# Patient Record
Sex: FEMALE | Employment: OTHER | URBAN - METROPOLITAN AREA
[De-identification: names, ages, dates, MRNs, and addresses within clinical notes are randomized per-mention and may not be internally consistent; named-entity substitution may affect disease eponyms.]

---

## 2017-05-18 ENCOUNTER — HOSPITAL ENCOUNTER (OUTPATIENT)
Dept: MAMMOGRAPHY | Age: 62
Discharge: HOME | End: 2017-05-18
Attending: FAMILY MEDICINE
Payer: COMMERCIAL

## 2017-05-18 DIAGNOSIS — Z12.31 VISIT FOR SCREENING MAMMOGRAM: ICD-10-CM

## 2017-05-18 PROCEDURE — 77067 SCR MAMMO BI INCL CAD: CPT

## 2018-12-22 ENCOUNTER — HOSPITAL ENCOUNTER (OUTPATIENT)
Dept: CT IMAGING | Age: 63
Discharge: HOME | End: 2018-12-22
Attending: FAMILY MEDICINE
Payer: MEDICARE

## 2018-12-22 DIAGNOSIS — G44.319 ACUTE POST-TRAUMATIC HEADACHE: ICD-10-CM

## 2018-12-22 PROCEDURE — 70450 CT HEAD/BRAIN W/O DYE: CPT

## 2021-04-01 ENCOUNTER — TRANSCRIBE ORDER (OUTPATIENT)
Dept: SCHEDULING | Age: 66
End: 2021-04-01

## 2021-04-01 DIAGNOSIS — M81.0 OSTEOPOROSIS: Primary | ICD-10-CM

## 2021-04-12 ENCOUNTER — HOSPITAL ENCOUNTER (OUTPATIENT)
Dept: MAMMOGRAPHY | Age: 66
Discharge: HOME | End: 2021-04-12
Attending: FAMILY MEDICINE
Payer: MEDICARE

## 2021-04-12 DIAGNOSIS — M81.0 OSTEOPOROSIS: ICD-10-CM

## 2021-04-12 PROCEDURE — 77080 DXA BONE DENSITY AXIAL: CPT

## 2022-09-23 ENCOUNTER — TRANSCRIBE ORDER (OUTPATIENT)
Dept: SCHEDULING | Age: 67
End: 2022-09-23

## 2022-09-23 DIAGNOSIS — R44.3 HALLUCINATIONS, UNSPECIFIED: Primary | ICD-10-CM

## 2022-10-26 ENCOUNTER — HOSPITAL ENCOUNTER (OUTPATIENT)
Dept: CT IMAGING | Age: 67
Discharge: HOME | End: 2022-10-26
Attending: FAMILY MEDICINE
Payer: MEDICARE

## 2022-10-26 DIAGNOSIS — R44.3 HALLUCINATIONS, UNSPECIFIED: ICD-10-CM

## 2022-10-26 PROCEDURE — 70450 CT HEAD/BRAIN W/O DYE: CPT

## 2024-06-18 ENCOUNTER — HOSPITAL ENCOUNTER (INPATIENT)
Facility: HOSPITAL | Age: 69
LOS: 3 days | Discharge: HOME | End: 2024-06-21
Attending: EMERGENCY MEDICINE | Admitting: INTERNAL MEDICINE
Payer: MEDICARE

## 2024-06-18 ENCOUNTER — APPOINTMENT (OUTPATIENT)
Facility: HOSPITAL | Age: 69
End: 2024-06-18
Payer: MEDICARE

## 2024-06-18 DIAGNOSIS — J96.21 ACUTE ON CHRONIC RESPIRATORY FAILURE WITH HYPOXIA AND HYPERCAPNIA (HCC): ICD-10-CM

## 2024-06-18 DIAGNOSIS — J96.01 ACUTE HYPOXEMIC RESPIRATORY FAILURE (HCC): Primary | ICD-10-CM

## 2024-06-18 DIAGNOSIS — J96.22 ACUTE ON CHRONIC RESPIRATORY FAILURE WITH HYPOXIA AND HYPERCAPNIA (HCC): ICD-10-CM

## 2024-06-18 LAB
ALBUMIN SERPL-MCNC: 3.1 G/DL (ref 3.5–5)
ALBUMIN/GLOB SERPL: 0.7 (ref 1.1–2.2)
ALP SERPL-CCNC: 91 U/L (ref 45–117)
ALT SERPL-CCNC: 10 U/L (ref 12–78)
ANION GAP BLD CALC-SCNC: 4 (ref 10–20)
ANION GAP SERPL CALC-SCNC: 3 MMOL/L (ref 5–15)
AST SERPL-CCNC: 19 U/L (ref 15–37)
B PERT DNA SPEC QL NAA+PROBE: NOT DETECTED
BASE EXCESS BLD CALC-SCNC: 9 MMOL/L
BASOPHILS # BLD: 0.1 K/UL (ref 0–0.1)
BASOPHILS NFR BLD: 1 % (ref 0–1)
BILIRUB SERPL-MCNC: 0.3 MG/DL (ref 0.2–1)
BORDETELLA PARAPERTUSSIS BY PCR: NOT DETECTED
BUN SERPL-MCNC: 14 MG/DL (ref 6–20)
BUN/CREAT SERPL: 23 (ref 12–20)
C PNEUM DNA SPEC QL NAA+PROBE: NOT DETECTED
CA-I BLD-MCNC: 1.15 MMOL/L (ref 1.12–1.32)
CALCIUM SERPL-MCNC: 9.3 MG/DL (ref 8.5–10.1)
CHLORIDE BLD-SCNC: 102 MMOL/L (ref 100–108)
CHLORIDE SERPL-SCNC: 103 MMOL/L (ref 97–108)
CO2 BLD-SCNC: 37 MMOL/L (ref 19–24)
CO2 SERPL-SCNC: 32 MMOL/L (ref 21–32)
CREAT SERPL-MCNC: 0.6 MG/DL (ref 0.55–1.02)
CREAT UR-MCNC: 0.4 MG/DL (ref 0.6–1.3)
DIFFERENTIAL METHOD BLD: ABNORMAL
EOSINOPHIL # BLD: 0.1 K/UL (ref 0–0.4)
EOSINOPHIL NFR BLD: 1 % (ref 0–7)
ERYTHROCYTE [DISTWIDTH] IN BLOOD BY AUTOMATED COUNT: 13.7 % (ref 11.5–14.5)
FLUAV SUBTYP SPEC NAA+PROBE: NOT DETECTED
FLUBV RNA SPEC QL NAA+PROBE: NOT DETECTED
GLOBULIN SER CALC-MCNC: 4.5 G/DL (ref 2–4)
GLUCOSE BLD STRIP.AUTO-MCNC: 130 MG/DL (ref 74–99)
GLUCOSE SERPL-MCNC: 128 MG/DL (ref 65–100)
HADV DNA SPEC QL NAA+PROBE: NOT DETECTED
HCO3 BLDA-SCNC: 36 MMOL/L
HCOV 229E RNA SPEC QL NAA+PROBE: NOT DETECTED
HCOV HKU1 RNA SPEC QL NAA+PROBE: NOT DETECTED
HCOV NL63 RNA SPEC QL NAA+PROBE: NOT DETECTED
HCOV OC43 RNA SPEC QL NAA+PROBE: NOT DETECTED
HCT VFR BLD AUTO: 39.2 % (ref 35–47)
HGB BLD-MCNC: 12 G/DL (ref 11.5–16)
HMPV RNA SPEC QL NAA+PROBE: NOT DETECTED
HPIV1 RNA SPEC QL NAA+PROBE: NOT DETECTED
HPIV2 RNA SPEC QL NAA+PROBE: NOT DETECTED
HPIV3 RNA SPEC QL NAA+PROBE: NOT DETECTED
HPIV4 RNA SPEC QL NAA+PROBE: NOT DETECTED
IMM GRANULOCYTES # BLD AUTO: 0 K/UL (ref 0–0.04)
IMM GRANULOCYTES NFR BLD AUTO: 0 % (ref 0–0.5)
LACTATE BLD-SCNC: <0.4 MMOL/L (ref 0.4–2)
LYMPHOCYTES # BLD: 1 K/UL (ref 0.8–3.5)
LYMPHOCYTES NFR BLD: 15 % (ref 12–49)
M PNEUMO DNA SPEC QL NAA+PROBE: NOT DETECTED
MCH RBC QN AUTO: 30.5 PG (ref 26–34)
MCHC RBC AUTO-ENTMCNC: 30.6 G/DL (ref 30–36.5)
MCV RBC AUTO: 99.7 FL (ref 80–99)
MONOCYTES # BLD: 0.5 K/UL (ref 0–1)
MONOCYTES NFR BLD: 7 % (ref 5–13)
NEUTS SEG # BLD: 5.3 K/UL (ref 1.8–8)
NEUTS SEG NFR BLD: 76 % (ref 32–75)
NRBC # BLD: 0 K/UL (ref 0–0.01)
NRBC BLD-RTO: 0 PER 100 WBC
NT PRO BNP: 932 PG/ML
PCO2 BLDV: 60.7 MMHG (ref 41–51)
PH BLDV: 7.39 (ref 7.32–7.42)
PLATELET # BLD AUTO: 338 K/UL (ref 150–400)
PMV BLD AUTO: 9.9 FL (ref 8.9–12.9)
PO2 BLDV: <27 MMHG (ref 25–40)
POTASSIUM BLD-SCNC: 3.9 MMOL/L (ref 3.5–5.5)
POTASSIUM SERPL-SCNC: 3.5 MMOL/L (ref 3.5–5.1)
PROCALCITONIN SERPL-MCNC: <0.05 NG/ML
PROT SERPL-MCNC: 7.6 G/DL (ref 6.4–8.2)
RBC # BLD AUTO: 3.93 M/UL (ref 3.8–5.2)
RSV RNA SPEC QL NAA+PROBE: NOT DETECTED
RV+EV RNA SPEC QL NAA+PROBE: NOT DETECTED
SARS-COV-2 RNA RESP QL NAA+PROBE: NOT DETECTED
SODIUM BLD-SCNC: 143 MMOL/L (ref 136–145)
SODIUM SERPL-SCNC: 138 MMOL/L (ref 136–145)
SPECIMEN SITE: ABNORMAL
WBC # BLD AUTO: 6.9 K/UL (ref 3.6–11)

## 2024-06-18 PROCEDURE — 82947 ASSAY GLUCOSE BLOOD QUANT: CPT

## 2024-06-18 PROCEDURE — 94664 DEMO&/EVAL PT USE INHALER: CPT

## 2024-06-18 PROCEDURE — 6360000002 HC RX W HCPCS: Performed by: EMERGENCY MEDICINE

## 2024-06-18 PROCEDURE — 84295 ASSAY OF SERUM SODIUM: CPT

## 2024-06-18 PROCEDURE — 71045 X-RAY EXAM CHEST 1 VIEW: CPT

## 2024-06-18 PROCEDURE — 6360000002 HC RX W HCPCS: Performed by: INTERNAL MEDICINE

## 2024-06-18 PROCEDURE — 96375 TX/PRO/DX INJ NEW DRUG ADDON: CPT

## 2024-06-18 PROCEDURE — 6370000000 HC RX 637 (ALT 250 FOR IP): Performed by: INTERNAL MEDICINE

## 2024-06-18 PROCEDURE — 82330 ASSAY OF CALCIUM: CPT

## 2024-06-18 PROCEDURE — 99285 EMERGENCY DEPT VISIT HI MDM: CPT

## 2024-06-18 PROCEDURE — 96374 THER/PROPH/DIAG INJ IV PUSH: CPT

## 2024-06-18 PROCEDURE — 94761 N-INVAS EAR/PLS OXIMETRY MLT: CPT

## 2024-06-18 PROCEDURE — 82803 BLOOD GASES ANY COMBINATION: CPT

## 2024-06-18 PROCEDURE — 84145 PROCALCITONIN (PCT): CPT

## 2024-06-18 PROCEDURE — 83880 ASSAY OF NATRIURETIC PEPTIDE: CPT

## 2024-06-18 PROCEDURE — 2500000003 HC RX 250 WO HCPCS: Performed by: EMERGENCY MEDICINE

## 2024-06-18 PROCEDURE — 93005 ELECTROCARDIOGRAM TRACING: CPT | Performed by: EMERGENCY MEDICINE

## 2024-06-18 PROCEDURE — 84132 ASSAY OF SERUM POTASSIUM: CPT

## 2024-06-18 PROCEDURE — 6360000002 HC RX W HCPCS: Performed by: PHYSICIAN ASSISTANT

## 2024-06-18 PROCEDURE — 80053 COMPREHEN METABOLIC PANEL: CPT

## 2024-06-18 PROCEDURE — 94640 AIRWAY INHALATION TREATMENT: CPT

## 2024-06-18 PROCEDURE — 2700000000 HC OXYGEN THERAPY PER DAY

## 2024-06-18 PROCEDURE — 0202U NFCT DS 22 TRGT SARS-COV-2: CPT

## 2024-06-18 PROCEDURE — 2060000000 HC ICU INTERMEDIATE R&B

## 2024-06-18 PROCEDURE — 36415 COLL VENOUS BLD VENIPUNCTURE: CPT

## 2024-06-18 PROCEDURE — 85025 COMPLETE CBC W/AUTO DIFF WBC: CPT

## 2024-06-18 PROCEDURE — 2580000003 HC RX 258: Performed by: EMERGENCY MEDICINE

## 2024-06-18 PROCEDURE — 6370000000 HC RX 637 (ALT 250 FOR IP): Performed by: EMERGENCY MEDICINE

## 2024-06-18 PROCEDURE — 2580000003 HC RX 258: Performed by: INTERNAL MEDICINE

## 2024-06-18 RX ORDER — BUDESONIDE 0.5 MG/2ML
0.5 INHALANT ORAL
Status: DISCONTINUED | OUTPATIENT
Start: 2024-06-18 | End: 2024-06-21 | Stop reason: HOSPADM

## 2024-06-18 RX ORDER — SODIUM CHLORIDE 0.9 % (FLUSH) 0.9 %
5-40 SYRINGE (ML) INJECTION EVERY 12 HOURS SCHEDULED
Status: DISCONTINUED | OUTPATIENT
Start: 2024-06-18 | End: 2024-06-21 | Stop reason: HOSPADM

## 2024-06-18 RX ORDER — DIAZEPAM 5 MG/1
5 TABLET ORAL EVERY 6 HOURS PRN
Status: DISCONTINUED | OUTPATIENT
Start: 2024-06-18 | End: 2024-06-21 | Stop reason: HOSPADM

## 2024-06-18 RX ORDER — ZOLPIDEM TARTRATE 5 MG/1
10 TABLET ORAL NIGHTLY PRN
Status: DISCONTINUED | OUTPATIENT
Start: 2024-06-18 | End: 2024-06-18

## 2024-06-18 RX ORDER — POLYETHYLENE GLYCOL 3350 17 G/17G
17 POWDER, FOR SOLUTION ORAL DAILY PRN
Status: DISCONTINUED | OUTPATIENT
Start: 2024-06-18 | End: 2024-06-21 | Stop reason: HOSPADM

## 2024-06-18 RX ORDER — ONDANSETRON 2 MG/ML
4 INJECTION INTRAMUSCULAR; INTRAVENOUS EVERY 6 HOURS PRN
Status: DISCONTINUED | OUTPATIENT
Start: 2024-06-18 | End: 2024-06-21 | Stop reason: HOSPADM

## 2024-06-18 RX ORDER — METOCLOPRAMIDE HYDROCHLORIDE 5 MG/ML
10 INJECTION INTRAMUSCULAR; INTRAVENOUS ONCE
Status: COMPLETED | OUTPATIENT
Start: 2024-06-18 | End: 2024-06-18

## 2024-06-18 RX ORDER — FLUOXETINE 10 MG/1
20 CAPSULE ORAL DAILY
Status: DISCONTINUED | OUTPATIENT
Start: 2024-06-19 | End: 2024-06-21 | Stop reason: HOSPADM

## 2024-06-18 RX ORDER — TRAMADOL HYDROCHLORIDE 50 MG/1
50 TABLET ORAL EVERY 6 HOURS PRN
Status: DISCONTINUED | OUTPATIENT
Start: 2024-06-18 | End: 2024-06-21 | Stop reason: HOSPADM

## 2024-06-18 RX ORDER — BUTALBITAL, ACETAMINOPHEN AND CAFFEINE 50; 325; 40 MG/1; MG/1; MG/1
1 TABLET ORAL EVERY 4 HOURS PRN
Status: DISCONTINUED | OUTPATIENT
Start: 2024-06-18 | End: 2024-06-21 | Stop reason: HOSPADM

## 2024-06-18 RX ORDER — FUROSEMIDE 10 MG/ML
40 INJECTION INTRAMUSCULAR; INTRAVENOUS ONCE
Status: COMPLETED | OUTPATIENT
Start: 2024-06-18 | End: 2024-06-18

## 2024-06-18 RX ORDER — SODIUM CHLORIDE 0.9 % (FLUSH) 0.9 %
5-40 SYRINGE (ML) INJECTION PRN
Status: DISCONTINUED | OUTPATIENT
Start: 2024-06-18 | End: 2024-06-21 | Stop reason: HOSPADM

## 2024-06-18 RX ORDER — DEXAMETHASONE SODIUM PHOSPHATE 10 MG/ML
10 INJECTION, SOLUTION INTRAMUSCULAR; INTRAVENOUS EVERY 6 HOURS
Status: DISCONTINUED | OUTPATIENT
Start: 2024-06-18 | End: 2024-06-18

## 2024-06-18 RX ORDER — IPRATROPIUM BROMIDE AND ALBUTEROL SULFATE 2.5; .5 MG/3ML; MG/3ML
1 SOLUTION RESPIRATORY (INHALATION)
Status: COMPLETED | OUTPATIENT
Start: 2024-06-18 | End: 2024-06-18

## 2024-06-18 RX ORDER — ENOXAPARIN SODIUM 100 MG/ML
30 INJECTION SUBCUTANEOUS DAILY
Status: DISCONTINUED | OUTPATIENT
Start: 2024-06-18 | End: 2024-06-21 | Stop reason: HOSPADM

## 2024-06-18 RX ORDER — ARFORMOTEROL TARTRATE 15 UG/2ML
15 SOLUTION RESPIRATORY (INHALATION)
Status: DISCONTINUED | OUTPATIENT
Start: 2024-06-18 | End: 2024-06-21 | Stop reason: HOSPADM

## 2024-06-18 RX ORDER — PROPRANOLOL HCL 60 MG
60 CAPSULE, EXTENDED RELEASE 24HR ORAL 2 TIMES DAILY
Status: DISCONTINUED | OUTPATIENT
Start: 2024-06-18 | End: 2024-06-18

## 2024-06-18 RX ORDER — QUETIAPINE FUMARATE 25 MG/1
50 TABLET, FILM COATED ORAL NIGHTLY
Status: DISCONTINUED | OUTPATIENT
Start: 2024-06-18 | End: 2024-06-21 | Stop reason: HOSPADM

## 2024-06-18 RX ORDER — PANTOPRAZOLE SODIUM 40 MG/1
40 TABLET, DELAYED RELEASE ORAL
Status: DISCONTINUED | OUTPATIENT
Start: 2024-06-19 | End: 2024-06-21 | Stop reason: HOSPADM

## 2024-06-18 RX ORDER — SODIUM CHLORIDE 9 MG/ML
INJECTION, SOLUTION INTRAVENOUS PRN
Status: DISCONTINUED | OUTPATIENT
Start: 2024-06-18 | End: 2024-06-21 | Stop reason: HOSPADM

## 2024-06-18 RX ORDER — ACETAMINOPHEN 325 MG/1
650 TABLET ORAL EVERY 6 HOURS PRN
Status: DISCONTINUED | OUTPATIENT
Start: 2024-06-18 | End: 2024-06-21 | Stop reason: HOSPADM

## 2024-06-18 RX ORDER — ACETAMINOPHEN 650 MG/1
650 SUPPOSITORY RECTAL EVERY 6 HOURS PRN
Status: DISCONTINUED | OUTPATIENT
Start: 2024-06-18 | End: 2024-06-21 | Stop reason: HOSPADM

## 2024-06-18 RX ORDER — FUROSEMIDE 10 MG/ML
20 INJECTION INTRAMUSCULAR; INTRAVENOUS 2 TIMES DAILY
Status: DISCONTINUED | OUTPATIENT
Start: 2024-06-18 | End: 2024-06-19

## 2024-06-18 RX ORDER — LEVOFLOXACIN 5 MG/ML
750 INJECTION, SOLUTION INTRAVENOUS EVERY 24 HOURS
Status: DISCONTINUED | OUTPATIENT
Start: 2024-06-18 | End: 2024-06-19

## 2024-06-18 RX ORDER — LANOLIN ALCOHOL/MO/W.PET/CERES
3 CREAM (GRAM) TOPICAL NIGHTLY PRN
Status: DISCONTINUED | OUTPATIENT
Start: 2024-06-18 | End: 2024-06-21 | Stop reason: HOSPADM

## 2024-06-18 RX ORDER — LEVOTHYROXINE SODIUM 0.05 MG/1
50 TABLET ORAL
Status: DISCONTINUED | OUTPATIENT
Start: 2024-06-18 | End: 2024-06-21 | Stop reason: HOSPADM

## 2024-06-18 RX ORDER — PREDNISONE 20 MG/1
40 TABLET ORAL DAILY
Status: DISCONTINUED | OUTPATIENT
Start: 2024-06-19 | End: 2024-06-19

## 2024-06-18 RX ORDER — ONDANSETRON 4 MG/1
4 TABLET, ORALLY DISINTEGRATING ORAL EVERY 8 HOURS PRN
Status: DISCONTINUED | OUTPATIENT
Start: 2024-06-18 | End: 2024-06-21 | Stop reason: HOSPADM

## 2024-06-18 RX ORDER — PROPRANOLOL HCL 60 MG
60 CAPSULE, EXTENDED RELEASE 24HR ORAL DAILY
Status: DISCONTINUED | OUTPATIENT
Start: 2024-06-19 | End: 2024-06-21 | Stop reason: HOSPADM

## 2024-06-18 RX ORDER — BENZONATATE 100 MG/1
100 CAPSULE ORAL 3 TIMES DAILY PRN
Status: DISCONTINUED | OUTPATIENT
Start: 2024-06-18 | End: 2024-06-21 | Stop reason: HOSPADM

## 2024-06-18 RX ORDER — VENLAFAXINE HYDROCHLORIDE 150 MG/1
150 CAPSULE, EXTENDED RELEASE ORAL DAILY
Status: DISCONTINUED | OUTPATIENT
Start: 2024-06-18 | End: 2024-06-21 | Stop reason: HOSPADM

## 2024-06-18 RX ADMIN — SODIUM CHLORIDE: 9 INJECTION, SOLUTION INTRAVENOUS at 11:04

## 2024-06-18 RX ADMIN — DOXYCYCLINE 100 MG: 100 INJECTION, POWDER, LYOPHILIZED, FOR SOLUTION INTRAVENOUS at 08:06

## 2024-06-18 RX ADMIN — SODIUM CHLORIDE, PRESERVATIVE FREE 10 ML: 5 INJECTION INTRAVENOUS at 10:40

## 2024-06-18 RX ADMIN — BUDESONIDE 500 MCG: 0.5 INHALANT RESPIRATORY (INHALATION) at 12:30

## 2024-06-18 RX ADMIN — QUETIAPINE FUMARATE 50 MG: 25 TABLET ORAL at 21:42

## 2024-06-18 RX ADMIN — SODIUM CHLORIDE, PRESERVATIVE FREE 10 ML: 5 INJECTION INTRAVENOUS at 21:42

## 2024-06-18 RX ADMIN — FUROSEMIDE 20 MG: 10 INJECTION, SOLUTION INTRAMUSCULAR; INTRAVENOUS at 18:10

## 2024-06-18 RX ADMIN — ARFORMOTEROL TARTRATE 15 MCG: 15 SOLUTION RESPIRATORY (INHALATION) at 20:10

## 2024-06-18 RX ADMIN — LEVOFLOXACIN 750 MG: 5 INJECTION, SOLUTION INTRAVENOUS at 11:05

## 2024-06-18 RX ADMIN — ARFORMOTEROL TARTRATE 15 MCG: 15 SOLUTION RESPIRATORY (INHALATION) at 12:30

## 2024-06-18 RX ADMIN — METOCLOPRAMIDE 10 MG: 5 INJECTION, SOLUTION INTRAMUSCULAR; INTRAVENOUS at 05:56

## 2024-06-18 RX ADMIN — DEXAMETHASONE SODIUM PHOSPHATE 10 MG: 10 INJECTION, SOLUTION INTRAMUSCULAR; INTRAVENOUS at 07:05

## 2024-06-18 RX ADMIN — DEXAMETHASONE SODIUM PHOSPHATE 10 MG: 10 INJECTION, SOLUTION INTRAMUSCULAR; INTRAVENOUS at 13:30

## 2024-06-18 RX ADMIN — LEVOTHYROXINE SODIUM 50 MCG: 0.05 TABLET ORAL at 10:46

## 2024-06-18 RX ADMIN — ENOXAPARIN SODIUM 30 MG: 100 INJECTION SUBCUTANEOUS at 10:36

## 2024-06-18 RX ADMIN — VENLAFAXINE HYDROCHLORIDE 150 MG: 150 CAPSULE, EXTENDED RELEASE ORAL at 10:36

## 2024-06-18 RX ADMIN — IPRATROPIUM BROMIDE AND ALBUTEROL SULFATE 1 DOSE: .5; 3 SOLUTION RESPIRATORY (INHALATION) at 05:59

## 2024-06-18 RX ADMIN — PROPRANOLOL HYDROCHLORIDE 60 MG: 60 CAPSULE, EXTENDED RELEASE ORAL at 10:46

## 2024-06-18 RX ADMIN — FUROSEMIDE 40 MG: 10 INJECTION, SOLUTION INTRAMUSCULAR; INTRAVENOUS at 08:06

## 2024-06-18 RX ADMIN — BUDESONIDE 500 MCG: 0.5 INHALANT RESPIRATORY (INHALATION) at 20:10

## 2024-06-18 RX ADMIN — ACETAMINOPHEN 650 MG: 325 TABLET ORAL at 18:10

## 2024-06-18 ASSESSMENT — PAIN DESCRIPTION - ORIENTATION
ORIENTATION: ANTERIOR
ORIENTATION: ANTERIOR

## 2024-06-18 ASSESSMENT — PAIN SCALES - GENERAL
PAINLEVEL_OUTOF10: 0
PAINLEVEL_OUTOF10: 10
PAINLEVEL_OUTOF10: 0
PAINLEVEL_OUTOF10: 6
PAINLEVEL_OUTOF10: 0
PAINLEVEL_OUTOF10: 4

## 2024-06-18 ASSESSMENT — PAIN DESCRIPTION - LOCATION
LOCATION: HEAD
LOCATION: HEAD

## 2024-06-18 ASSESSMENT — PAIN DESCRIPTION - DESCRIPTORS
DESCRIPTORS: ACHING
DESCRIPTORS: CRUSHING;JABBING

## 2024-06-18 ASSESSMENT — PAIN DESCRIPTION - PAIN TYPE: TYPE: ACUTE PAIN

## 2024-06-18 ASSESSMENT — PAIN - FUNCTIONAL ASSESSMENT: PAIN_FUNCTIONAL_ASSESSMENT: ACTIVITIES ARE NOT PREVENTED

## 2024-06-18 ASSESSMENT — PAIN DESCRIPTION - FREQUENCY: FREQUENCY: INTERMITTENT

## 2024-06-18 NOTE — ED NOTES
Verbal and bedside ED summary given to receiving RN (Zenia). Care transitioned at the bedside; all questions answered.       - Pt is AO4 on high alex nc  - Side rails x2  - Call light within reach   - No acute distress reported or observed

## 2024-06-18 NOTE — ED PROVIDER NOTES
[facility] 2 PROGRESSIVE CARE  EMERGENCY DEPARTMENT ENCOUNTER       Pt Name: Mara Sharp  MRN: [id number]  Birthdate 1955  Date of evaluation: 6/18/2024  Provider: Joon Vera DO   PCP: Nenita Poe MD  Note Started: 5:43 AM EDT 6/18/24     CHIEF COMPLAINT       Chief Complaint   Patient presents with    Headache        HISTORY OF PRESENT ILLNESS: 1 or more elements      History From: Patient, History limited by: none     Mara Sharp is a 69 y.o. female presenting the emergency department complaining of headache, shortness of breath.  Patient has a history of idiopathic pulmonary fibrosis, has a history of chronic headaches.  Headache is similar to her prior headaches, no visual disturbances or lateralizing deficits, took some aspirin at home with no improvement.  Denies any fever.  She also feels like her breathing is not quite normal, she is chronically on 3 L oxygen secondary to idiopathic pulmonary fibrosis.  She became concerned when she noticed her oxygen level around 91 to 92%, she states her pulse oxygen is normally around 95 to 96% on 3 L per       Please See MDM for Additional Details of the HPI/PMH  Nursing Notes were all reviewed and agreed with or any disagreements were addressed in the HPI.     REVIEW OF SYSTEMS        Positives and Pertinent negatives as per HPI.    PAST HISTORY     Past Medical History:  Past Medical History:   Diagnosis Date    Chronic back pain     Endocrine disease     thyroid disease, hormone imbalance    Hx of seasonal allergies     Hx of tonsillectomy     Insomnia     Psychiatric disorder     depression/anxiety    Tremor     Tubal ligation status        Past Surgical History:  Past Surgical History:   Procedure Laterality Date    BREAST BIOPSY Right     benign surgical bx    BREAST SURGERY      lumpectomy    CATARACT REMOVAL      GASTRIC BYPASS SURGERY      ORTHOPEDIC SURGERY      spinal    TONSILLECTOMY      TUBAL LIGATION         Family History:  Family History  Nucleated RBCs 0.0 0  WBC    nRBC 0.00 0.00 - 0.01 K/uL    Neutrophils % 76 (H) 32 - 75 %    Lymphocytes % 15 12 - 49 %    Monocytes % 7 5 - 13 %    Eosinophils % 1 0 - 7 %    Basophils % 1 0 - 1 %    Immature Granulocytes % 0 0.0 - 0.5 %    Neutrophils Absolute 5.3 1.8 - 8.0 K/UL    Lymphocytes Absolute 1.0 0.8 - 3.5 K/UL    Monocytes Absolute 0.5 0.0 - 1.0 K/UL    Eosinophils Absolute 0.1 0.0 - 0.4 K/UL    Basophils Absolute 0.1 0.0 - 0.1 K/UL    Immature Granulocytes Absolute 0.0 0.00 - 0.04 K/UL    Differential Type AUTOMATED     Comprehensive Metabolic Panel    Collection Time: 06/18/24  5:51 AM   Result Value Ref Range    Sodium 138 136 - 145 mmol/L    Potassium 3.5 3.5 - 5.1 mmol/L    Chloride 103 97 - 108 mmol/L    CO2 32 21 - 32 mmol/L    Anion Gap 3 (L) 5 - 15 mmol/L    Glucose 128 (H) 65 - 100 mg/dL    BUN 14 6 - 20 MG/DL    Creatinine 0.60 0.55 - 1.02 MG/DL    BUN/Creatinine Ratio 23 (H) 12 - 20      Est, Glom Filt Rate >90 >60 ml/min/1.73m2    Calcium 9.3 8.5 - 10.1 MG/DL    Total Bilirubin 0.3 0.2 - 1.0 MG/DL    ALT 10 (L) 12 - 78 U/L    AST 19 15 - 37 U/L    Alk Phosphatase 91 45 - 117 U/L    Total Protein 7.6 6.4 - 8.2 g/dL    Albumin 3.1 (L) 3.5 - 5.0 g/dL    Globulin 4.5 (H) 2.0 - 4.0 g/dL    Albumin/Globulin Ratio 0.7 (L) 1.1 - 2.2     Brain Natriuretic Peptide    Collection Time: 06/18/24  5:51 AM   Result Value Ref Range    NT Pro- (H) <125 PG/ML   EKG 12 Lead    Collection Time: 06/18/24  6:00 AM   Result Value Ref Range    Ventricular Rate 77 BPM    Atrial Rate 77 BPM    P-R Interval 154 ms    QRS Duration 80 ms    Q-T Interval 388 ms    QTc Calculation (Bazett) 439 ms    P Axis 89 degrees    R Axis -27 degrees    T Axis 109 degrees    Diagnosis       Normal sinus rhythm  ST & T wave abnormality, consider lateral ischemia  When compared with ECG of 22-SEP-2012 12:23,  Nonspecific T wave abnormality now evident in Lateral leads     POCT Blood Gas & Electrolytes    Collection Time:

## 2024-06-18 NOTE — CONSULTS
Pulmonary Note  Name: Mara Sharp     : 1955  Hospital: [facility]    Date: 2024 9:05 AM Date of Admission: 2024       Impression:   Plan:   -- Chronic hypoxic / hypercarbic respiratory failure - not clear to me that she is markedly different from her baseline functioning  -- MAC, bronchiectasis, hx pseudomonas  -- RA/dermatomyositis  -- Deconditioning  -- Headache  -- Anxiety -- o2, baseline 3l/m, may need more with exertion  -- Levaquin noted, reasonable.  Can switch back to her chronic azithro/moxifloxacin at discharge.  -- Mobilize as able  -- Low dose diuretic reasonable  -- Echo to screen for PH  -- ICS/LABA - home advair  -- Can taper off steroids from our view  -- Flutter valve  -- DVT ppx    Thanks for the consult!     CC:   Chief Complaint   Patient presents with    Headache      Interval History:      Initial HPI:   -  Presented to the ED with headache and dyspnea.  Reports dyspnea particularly worse with exertion, insidious onset over the past several weeks.  Quality breathlessness, modified by worse with exertion, associated intermittent nonproductive cough which is mild.  Known bronchiectasis/MAC, follows with Dr. Sanchez at [facility].  She is currently on second line MAC therapy with azithro and moxifloxacin.  She does have history pseudomonas.  Tells me that she is due for a repeat CT scan with him to ensure no significant progression of underlying disease, her last CT scans she reports were stable.  Denies chest pain or fevers, no change in sputum.  Does get some LE swelling but goes away when she elevates her legs.  Per Dr. Sanchez's last clinic note, advair 250, nebs.  RA/dermatomyositis as well for which she follows with rheumatology.  I turned her down to her usual 3 l/m and her sat is 93% at rest.    Nonsmoker, father had hx MAC as well she reports.    Exam Findings:  General: Awake, alert, no acute distress   HEENT: NC/AT, EOMI, moist mucous membranes

## 2024-06-18 NOTE — CARE COORDINATION
Care Management Initial Assessment       RUR: 10%(LOW RUR)  Readmission? No  1st IM letter given? Yes - 06/17  1st  letter given: No     06/18/24 7617   Service Assessment   Patient Orientation Alert and Oriented;Person;Place;Situation;Self   Cognition Alert   History Provided By Patient   Primary Caregiver Self   Accompanied By/Relationship Marii Melchor [phone]   Support Systems Spouse/Significant Other;Family Members  (Josh Sharp, Spouse [phone] and Marii Melchor [phone])   Patient's Healthcare Decision Maker is: Named in Scanned ACP Document   PCP Verified by CM Yes   Last Visit to PCP Within last 3 months   Prior Functional Level Assistance with the following:;Bathing;Dressing;Toileting;Feeding;Cooking;Housework;Mobility   Current Functional Level Assistance with the following:;Bathing;Dressing;Toileting;Feeding;Cooking;Housework;Shopping;Mobility   Can patient return to prior living arrangement Yes   Ability to make needs known: Good   Family able to assist with home care needs: Yes   Would you like for me to discuss the discharge plan with any other family members/significant others, and if so, who? Yes  (Josh Sharp, Spouse [phone] and Marii Melchor [phone])   Financial Resources Medicare   Community Resources None   CM/SW Referral DME   Social/Functional History   Lives With Spouse   Type of Home House   Home Layout Two level;Able to Live on Main level with bedroom/bathroom   Home Access Stairs to enter with rails   Entrance Stairs - Number of Steps 6 MEAGHAN in the back; 2 MEAGHAN in the front   Entrance Stairs - Rails Both   Bathroom Equipment Toilet raiser;Shower chair;Commode   Home Equipment Walker - Rolling;Cane;Wheelchair - Manual;Oxygen  (3LPM ([employer])  and nebulizer)   Active  No   Patient's  Info Marii Melchor [phone]   Discharge Planning   Type of Residence House   Living Arrangements Spouse/Significant Other  (Josh Sharp, Spouse [phone] and private caregivers

## 2024-06-18 NOTE — ED NOTES
DO Vera made aware of patient's O2 sats and nc 6L. No new orders at this time. Pt is currently resting with mouth open and eyes closed. Rouses easily.

## 2024-06-18 NOTE — H&P
Hospitalist Admission Note    NAME:   Mara Sharp   : 1955   MRN: [id number]     Date/Time: 2024 4:48 PM    Patient PCP: Nenita Poe MD    ______________________________________________________________________  Given the patient's current clinical presentation, I have a high level of concern for decompensation if discharged from the emergency department.  Complex decision making was performed, which includes reviewing the patient's available past medical records, laboratory results, and x-ray films.       My assessment of this patient's clinical condition and my plan of care is as follows.    Assessment / Plan:  Acute on chronic hypoxic respiratory failure  Hx of MAC    On 3L of oxygen on baseline  In ED, desaturated to 86% on 5L of supplemental oxygen- oxygen requirement went upto 8L- now back to 3L of supplemental oxygen  Start levofloxacin for possible pneumonia  Will follow procalcitonin  Start low dose lasix  Will follow ECHO  Will change iv steroids to po prednisone and taper off steroids as per pulmonary recs  Appreciate pulmonary consult      Anxiety:  Resume home meds  Valium prn, seroquel, venlafaxine, fluoxetine      Headache  Resume home meds- fioricet prn    Deconditioning  PT/OT consult                      Medical Decision Making:   I personally reviewed labs: cbc, CMP  I personally reviewed imaging:CXR  I personally reviewed EKG: interpreted by me- normal sinus rhythm, no acute ST-T wave changes  Toxic drug monitoring:   Discussed case with: ED provider. After discussion I am in agreement that acuity of patient's medical condition necessitates hospital stay.      Code Status: DNR  DVT Prophylaxis: lovenox  Baseline:     Subjective:   CHIEF COMPLAINT: shortness of breath    HISTORY OF PRESENT ILLNESS:     Mara Sharp is a 69 y.o.  female with PMHx significant for chronic back pain, depression, anxiety, hx of MAC, Rheumatoid arthritis , headache, anxiety presented to ED with  c/o shortness of breath. She is currently on second line therapy for MAC with azithromycin and moxifloxacin.   Patient is poor historian  Later, patient's step daughter was by the bedside. States that patient follow Dr. Sanchez at [facility]. Was suppose to get cognitive test for dementia for which they have waited for years tomorrow which she had to cancel. Has appointment for CT scan and pulmonary consult on Thursday.      We were asked to admit for work up and evaluation of the above problems.     Past Medical History:   Diagnosis Date    Chronic back pain     Endocrine disease     thyroid disease, hormone imbalance    Hx of seasonal allergies     Hx of tonsillectomy     Insomnia     Psychiatric disorder     depression/anxiety    Tremor     Tubal ligation status         Past Surgical History:   Procedure Laterality Date    BREAST BIOPSY Right     benign surgical bx    BREAST SURGERY      lumpectomy    CATARACT REMOVAL      GASTRIC BYPASS SURGERY      ORTHOPEDIC SURGERY      spinal    TONSILLECTOMY      TUBAL LIGATION         Social History     Tobacco Use    Smoking status: Never    Smokeless tobacco: Never   Substance Use Topics    Alcohol use: No        Family History   Problem Relation Age of Onset    Heart Disease Father     Cancer Mother     Allergy (Severe) Other        Allergies   Allergen Reactions    Cephalosporins Other (See Comments)     \"jittery\"    Montelukast      Other reaction(s): Unknown (comments)  \"jittery\"    Nsaids Other (See Comments)     Does not take due to gastric bypass surgery        Prior to Admission medications    Medication Sig Start Date End Date Taking? Authorizing Provider   GARLIC PO Take by mouth    Automatic Reconciliation, Ar   B Complex Vitamins (VITAMIN B COMPLEX PO) Take by mouth    Automatic Reconciliation, Ar   cyanocobalamin 1000 MCG/ML injection Inject into the muscle See Admin Instructions    Automatic Reconciliation, Ar   diazePAM (VALIUM) 5 MG tablet Take by mouth every 6  Jenny Garcia MD    Procedures: see electronic medical records for all procedures/Xrays and details which were not copied into this note but were reviewed prior to creation of Plan.

## 2024-06-19 LAB
ANION GAP SERPL CALC-SCNC: 4 MMOL/L (ref 5–15)
BASOPHILS # BLD: 0 K/UL (ref 0–0.1)
BASOPHILS NFR BLD: 0 % (ref 0–1)
BUN SERPL-MCNC: 24 MG/DL (ref 6–20)
BUN/CREAT SERPL: 23 (ref 12–20)
CALCIUM SERPL-MCNC: 9.1 MG/DL (ref 8.5–10.1)
CHLORIDE SERPL-SCNC: 100 MMOL/L (ref 97–108)
CO2 SERPL-SCNC: 34 MMOL/L (ref 21–32)
CREAT SERPL-MCNC: 1.04 MG/DL (ref 0.55–1.02)
DIFFERENTIAL METHOD BLD: ABNORMAL
EOSINOPHIL # BLD: 0 K/UL (ref 0–0.4)
EOSINOPHIL NFR BLD: 0 % (ref 0–7)
ERYTHROCYTE [DISTWIDTH] IN BLOOD BY AUTOMATED COUNT: 13.6 % (ref 11.5–14.5)
GLUCOSE SERPL-MCNC: 154 MG/DL (ref 65–100)
HCT VFR BLD AUTO: 36.4 % (ref 35–47)
HGB BLD-MCNC: 11.5 G/DL (ref 11.5–16)
IMM GRANULOCYTES # BLD AUTO: 0.1 K/UL (ref 0–0.04)
IMM GRANULOCYTES NFR BLD AUTO: 1 % (ref 0–0.5)
LYMPHOCYTES # BLD: 1 K/UL (ref 0.8–3.5)
LYMPHOCYTES NFR BLD: 14 % (ref 12–49)
MAGNESIUM SERPL-MCNC: 1.5 MG/DL (ref 1.6–2.4)
MCH RBC QN AUTO: 30.7 PG (ref 26–34)
MCHC RBC AUTO-ENTMCNC: 31.6 G/DL (ref 30–36.5)
MCV RBC AUTO: 97.1 FL (ref 80–99)
MONOCYTES # BLD: 0.4 K/UL (ref 0–1)
MONOCYTES NFR BLD: 6 % (ref 5–13)
NEUTS SEG # BLD: 5.6 K/UL (ref 1.8–8)
NEUTS SEG NFR BLD: 79 % (ref 32–75)
NRBC # BLD: 0 K/UL (ref 0–0.01)
NRBC BLD-RTO: 0 PER 100 WBC
PHOSPHATE SERPL-MCNC: 4 MG/DL (ref 2.6–4.7)
PLATELET # BLD AUTO: 342 K/UL (ref 150–400)
PMV BLD AUTO: 9.9 FL (ref 8.9–12.9)
POTASSIUM SERPL-SCNC: 3.4 MMOL/L (ref 3.5–5.1)
PROCALCITONIN SERPL-MCNC: <0.05 NG/ML
RBC # BLD AUTO: 3.75 M/UL (ref 3.8–5.2)
SODIUM SERPL-SCNC: 138 MMOL/L (ref 136–145)
WBC # BLD AUTO: 7.1 K/UL (ref 3.6–11)

## 2024-06-19 PROCEDURE — 83735 ASSAY OF MAGNESIUM: CPT

## 2024-06-19 PROCEDURE — 36415 COLL VENOUS BLD VENIPUNCTURE: CPT

## 2024-06-19 PROCEDURE — 2060000000 HC ICU INTERMEDIATE R&B

## 2024-06-19 PROCEDURE — 80048 BASIC METABOLIC PNL TOTAL CA: CPT

## 2024-06-19 PROCEDURE — 97162 PT EVAL MOD COMPLEX 30 MIN: CPT

## 2024-06-19 PROCEDURE — 6370000000 HC RX 637 (ALT 250 FOR IP): Performed by: INTERNAL MEDICINE

## 2024-06-19 PROCEDURE — 85025 COMPLETE CBC W/AUTO DIFF WBC: CPT

## 2024-06-19 PROCEDURE — 6360000002 HC RX W HCPCS: Performed by: STUDENT IN AN ORGANIZED HEALTH CARE EDUCATION/TRAINING PROGRAM

## 2024-06-19 PROCEDURE — 97530 THERAPEUTIC ACTIVITIES: CPT

## 2024-06-19 PROCEDURE — 84145 PROCALCITONIN (PCT): CPT

## 2024-06-19 PROCEDURE — 84100 ASSAY OF PHOSPHORUS: CPT

## 2024-06-19 PROCEDURE — 94640 AIRWAY INHALATION TREATMENT: CPT

## 2024-06-19 PROCEDURE — 6370000000 HC RX 637 (ALT 250 FOR IP): Performed by: STUDENT IN AN ORGANIZED HEALTH CARE EDUCATION/TRAINING PROGRAM

## 2024-06-19 PROCEDURE — 6360000002 HC RX W HCPCS: Performed by: PHYSICIAN ASSISTANT

## 2024-06-19 PROCEDURE — 97166 OT EVAL MOD COMPLEX 45 MIN: CPT

## 2024-06-19 PROCEDURE — 2580000003 HC RX 258: Performed by: INTERNAL MEDICINE

## 2024-06-19 PROCEDURE — 6360000002 HC RX W HCPCS: Performed by: INTERNAL MEDICINE

## 2024-06-19 PROCEDURE — 97116 GAIT TRAINING THERAPY: CPT

## 2024-06-19 RX ORDER — LEVOFLOXACIN 5 MG/ML
750 INJECTION, SOLUTION INTRAVENOUS
Status: DISCONTINUED | OUTPATIENT
Start: 2024-06-20 | End: 2024-06-20 | Stop reason: SINTOL

## 2024-06-19 RX ORDER — MAGNESIUM SULFATE 1 G/100ML
1000 INJECTION INTRAVENOUS ONCE
Status: COMPLETED | OUTPATIENT
Start: 2024-06-19 | End: 2024-06-19

## 2024-06-19 RX ORDER — PREDNISONE 20 MG/1
20 TABLET ORAL DAILY
Status: DISCONTINUED | OUTPATIENT
Start: 2024-06-20 | End: 2024-06-21 | Stop reason: HOSPADM

## 2024-06-19 RX ORDER — POTASSIUM CHLORIDE 20 MEQ/1
20 TABLET, EXTENDED RELEASE ORAL ONCE
Status: COMPLETED | OUTPATIENT
Start: 2024-06-19 | End: 2024-06-19

## 2024-06-19 RX ADMIN — ARFORMOTEROL TARTRATE 15 MCG: 15 SOLUTION RESPIRATORY (INHALATION) at 10:00

## 2024-06-19 RX ADMIN — MAGNESIUM SULFATE HEPTAHYDRATE 1000 MG: 1 INJECTION, SOLUTION INTRAVENOUS at 10:01

## 2024-06-19 RX ADMIN — ENOXAPARIN SODIUM 30 MG: 100 INJECTION SUBCUTANEOUS at 09:13

## 2024-06-19 RX ADMIN — PROPRANOLOL HYDROCHLORIDE 60 MG: 60 CAPSULE, EXTENDED RELEASE ORAL at 09:14

## 2024-06-19 RX ADMIN — FLUOXETINE 20 MG: 10 CAPSULE ORAL at 09:16

## 2024-06-19 RX ADMIN — VENLAFAXINE HYDROCHLORIDE 150 MG: 150 CAPSULE, EXTENDED RELEASE ORAL at 09:14

## 2024-06-19 RX ADMIN — SODIUM CHLORIDE, PRESERVATIVE FREE 10 ML: 5 INJECTION INTRAVENOUS at 09:14

## 2024-06-19 RX ADMIN — PANTOPRAZOLE SODIUM 40 MG: 40 TABLET, DELAYED RELEASE ORAL at 09:17

## 2024-06-19 RX ADMIN — LEVOTHYROXINE SODIUM 50 MCG: 0.05 TABLET ORAL at 09:16

## 2024-06-19 RX ADMIN — SODIUM CHLORIDE, PRESERVATIVE FREE 10 ML: 5 INJECTION INTRAVENOUS at 20:00

## 2024-06-19 RX ADMIN — FUROSEMIDE 20 MG: 10 INJECTION, SOLUTION INTRAMUSCULAR; INTRAVENOUS at 09:17

## 2024-06-19 RX ADMIN — SODIUM CHLORIDE: 9 INJECTION, SOLUTION INTRAVENOUS at 11:23

## 2024-06-19 RX ADMIN — ACETAMINOPHEN 650 MG: 325 TABLET ORAL at 11:29

## 2024-06-19 RX ADMIN — QUETIAPINE FUMARATE 50 MG: 25 TABLET ORAL at 21:24

## 2024-06-19 RX ADMIN — POTASSIUM CHLORIDE 20 MEQ: 1500 TABLET, EXTENDED RELEASE ORAL at 09:17

## 2024-06-19 RX ADMIN — TRAMADOL HYDROCHLORIDE 50 MG: 50 TABLET ORAL at 15:16

## 2024-06-19 RX ADMIN — BUDESONIDE 500 MCG: 0.5 INHALANT RESPIRATORY (INHALATION) at 10:00

## 2024-06-19 RX ADMIN — PREDNISONE 40 MG: 20 TABLET ORAL at 09:14

## 2024-06-19 ASSESSMENT — PAIN DESCRIPTION - LOCATION
LOCATION: FACE

## 2024-06-19 ASSESSMENT — PAIN DESCRIPTION - ORIENTATION
ORIENTATION: RIGHT

## 2024-06-19 ASSESSMENT — PAIN DESCRIPTION - DESCRIPTORS
DESCRIPTORS: ACHING

## 2024-06-19 ASSESSMENT — PAIN SCALES - GENERAL
PAINLEVEL_OUTOF10: 6
PAINLEVEL_OUTOF10: 0
PAINLEVEL_OUTOF10: 4
PAINLEVEL_OUTOF10: 0
PAINLEVEL_OUTOF10: 6
PAINLEVEL_OUTOF10: 0

## 2024-06-19 NOTE — PROGRESS NOTES
0900- Verbal report given to INOCENCIA Powers (oncoming nurse) by INOCENCIA Knutson (offgoing nurse). Report included the following information Nurse Handoff Report, MAR, Recent Results, and Cardiac Rhythm NSR .   Dual Skin with INOCENCIA Jones       End of Shift Note    Bedside shift change report given to INOCENCIA Rivero  (oncoming nurse) by Priya Gardner RN (offgoing nurse).  Report included the following information SBAR, MAR, Recent Results, and Cardiac Rhythm NSR    Shift worked:  [id number]     Shift summary and any significant changes:     Patient now on 3 L N/C,  gave prn tylenol 1x for pain, patient had 1 BM during shift      Concerns for physician to address:  Echo still needs to be completed     Zone phone for oncoming shift:   [phone]       Activity:     Number times ambulated in hallways past shift: 0  Number of times OOB to chair past shift: 0    Cardiac:   Cardiac Monitoring: Yes           Access:  Current line(s): PIV     Genitourinary:   Urinary status: voiding    Respiratory:      Chronic home O2 use?: YES  Incentive spirometer at bedside: NO       GI:     Current diet:  ADULT DIET; Easy to Chew  Passing flatus: YES  Tolerating current diet: YES       Pain Management:   Patient states pain is manageable on current regimen: YES    Skin:     Interventions: float heels, limit briefs, internal/external urinary devices, and nutritional support    Patient Safety:  Fall Score:    Interventions: bed/chair alarm, gripper socks, and pt to call before getting OOB       Length of Stay:  Expected LOS: 3  Actual LOS: 0      Priya Gardner RN

## 2024-06-19 NOTE — PROGRESS NOTES
Hospitalist Progress Note    NAME:   Mara Sharp   : 1955   MRN: [id number]     Date/Time: 2024 7:12 PM  Patient PCP: Nenita Poe MD    Estimated discharge date:   Barriers: Echo      Assessment / Plan:    Acute on chronic hypoxic respiratory failure  Hx of MAC  Procalcitonin less than 0.05   On 3L of oxygen on baseline  In ED, desaturated to 86% on 5L of supplemental oxygen- oxygen requirement went upto 8L- now back to 3L of supplemental oxygen  Continue levofloxacin for possible pneumonia  IV Lasix was held  Will follow ECHO  Will change iv steroids to po prednisone and taper off steroids as per pulmonary recs  Appreciate pulmonary consult-okay for discharge from their standpoint      Anxiety:  Resume home meds  Valium prn, seroquel, venlafaxine, fluoxetine      Headache:  Resume home meds- fioricet prn     Deconditioning      Medical Decision Making:   I personally reviewed labs: CBC, BMP  I personally reviewed imaging:  I personally reviewed EKG:  Toxic drug monitoring:   Discussed case with: Discussed at length with family likely will be discharged on  after echo.  Patient should reschedule imaging, pulmonology, and neurology appointments.        Code Status: DNR  DVT Prophylaxis: Lovenox  GI Prophylaxis:    Subjective:     Chief Complaint / Reason for Physician Visit  Discussed with RN events overnight.   Family at bedside asking after outpatient CT.  Patient is being followed by pulmonology for [facility] outpatient.  Probably would do best unfortunately to schedule so it is all in the same system.  Unfortunately missed an appointment with a neurologist for prolonged evaluation for dementia.  Reassured family she is already showing early signs of dementia however she cannot be reschedule for further assessment.    Objective:     VITALS:   Last 24hrs VS reviewed since prior progress note. Most recent are:  Patient Vitals for the past 24 hrs:   BP Temp Temp src Pulse Resp SpO2

## 2024-06-19 NOTE — PLAN OF CARE
Pain Ratin/10   Pain Intervention(s):       Activity Tolerance:   Fair , requires rest breaks, and 3L NC at baseline    After treatment:   Patient left in no apparent distress sitting up in chair and Call bell within reach    COMMUNICATION/EDUCATION:   The patient's plan of care was discussed with: physical therapist and registered nurse         Thank you for this referral.  Kaleb Castañeda OT  Minutes: 17    Occupational Therapy Evaluation Charge Determination   History Examination Decision-Making   MEDIUM Complexity : Expanded review of history including physical, cognitive and psychocial history  MEDIUM Complexity: 3-5 Performance deficits relating to physical, cognitive, or psychosocial skills that result in activity limitations and/or participation restrictions MEDIUM Complexity: Patient may present with comorbidities that affect occupational performance. Minimal to moderate modifications of tasks or assist (eg. physical or verbal) with assist is necessary to enable pt to complete eval   Based on the above components, the patient evaluation is determined to be of the following complexity level: Medium

## 2024-06-19 NOTE — PROGRESS NOTES
4 Eyes Skin Assessment     NAME:  Mara Sharp  YOB: 1955  MEDICAL RECORD NUMBER:  [id number]    The patient is being assessed for  Admission    I agree that at least one RN has performed a thorough Head to Toe Skin Assessment on the patient. ALL assessment sites listed below have been assessed.      Areas assessed by both nurses:    Head, Face, Ears, Shoulders, Back, Chest, Arms, Elbows, Hands, Sacrum. Buttock, Coccyx, Ischium, and Legs. Feet and Heels        Does the Patient have a Wound? No noted wound(s)       Denzel Prevention initiated by RN: Yes  Wound Care Orders initiated by RN: No    Pressure Injury (Stage 3,4, Unstageable, DTI, NWPT, and Complex wounds) if present, place Wound referral order by RN under : No    New Ostomies, if present place, Ostomy referral order under : No     Nurse 1 eSignature: Electronically signed by Priya Gardner RN on 6/18/24 at 8:23 PM EDT    **SHARE this note so that the co-signing nurse can place an eSignature**    Nurse 2 eSignature: Electronically signed by ESLENA NAGEL RN on 6/18/24 at 9:12 PM EDT

## 2024-06-19 NOTE — PROGRESS NOTES
0750- Bedside and Verbal shift change report given to INOCENCIA Powers (oncoming nurse) by INOCENCIA Rivero  (offgoing nurse). Report included the following information Nurse Handoff Report, Intake/Output, MAR, Recent Results, and Cardiac Rhythm NSR .     End of Shift Note    Bedside shift change report given to INOCENCIA Lopez  (oncoming nurse) by Priya Gardner RN (offgoing nurse).  Report included the following information SBAR, MAR, Recent Results, and Cardiac Rhythm NSR    Shift worked:  [id number]     Shift summary and any significant changes:     Patient on 3 L N/C gave patient prn tramadol and tylenol for pain      Concerns for physician to address:  Echo is still needed to be completed     Zone phone for oncoming shift:   [phone]       Activity:     Number times ambulated in hallways past shift: 0  Number of times OOB to chair past shift: 1    Cardiac:   Cardiac Monitoring: Yes           Access:  Current line(s): PIV     Genitourinary:   Urinary status: voiding    Respiratory:      Chronic home O2 use?: YES  Incentive spirometer at bedside: NO       GI:     Current diet:  ADULT DIET; Easy to Chew  Passing flatus: YES  Tolerating current diet: YES       Pain Management:   Patient states pain is manageable on current regimen: YES    Skin:     Interventions: float heels, increase time out of bed, PT/OT consult, limit briefs, internal/external urinary devices, and nutritional support    Patient Safety:  Fall Score:    Interventions: bed/chair alarm, gripper socks, pt to call before getting OOB, and stay with me (per policy)       Length of Stay:  Expected LOS: 3  Actual LOS: 1      Priya Gardner RN

## 2024-06-19 NOTE — PROGRESS NOTES
Orders received, chart reviewed and patient evaluated by physical therapy. Pending progression with skilled acute physical therapy, recommend:  Therapy 2x a week in the home    Recommend standby assist x1 during OOB mobility, including ambulation to bathroom for toilet needs and bedside chair for all meals. O2 sats 92% post ambulation on 3L O2.     Full evaluation to follow.

## 2024-06-19 NOTE — PLAN OF CARE
Problem: Physical Therapy - Adult  Goal: By Discharge: Performs mobility at highest level of function for planned discharge setting.  See evaluation for individualized goals.  Description: FUNCTIONAL STATUS PRIOR TO ADMISSION: Admits to relying on support of furniture during household ambulation. Use of rollator walker for longer, community distances. However, states she mostly spends her day in bed, only mobilizing OOB if needing to use bathroom or make a meal for herself. States she ambulates ~30ft at most before needing a rest break. Wears 3L O2 at baseline. Denies history of falls.     HOME SUPPORT PRIOR TO ADMISSION: The patient lived with  however  is unable to physically assist. States she and  have paid caregiver 7hrs/day Monday-Friday. Step-daughters assist on the weekends/nighttime as needed.    Physical Therapy Goals  Initiated 6/19/2024  1.  Patient will move from supine to sit and sit to supine, scoot up and down, and roll side to side in bed with independence within 7 day(s).    2.  Patient will perform sit to stand with modified independence within 7 day(s).  3.  Patient will transfer from bed to chair and chair to bed with modified independence using the least restrictive device within 7 day(s).  4.  Patient will ambulate with modified independence for 50 feet with the least restrictive device within 7 day(s).   Outcome: Progressing   PHYSICAL THERAPY EVALUATION    Patient: Mara Sharp (69 y.o. female)  Date: 6/19/2024  Primary Diagnosis: Acute hypoxemic respiratory failure (HCC) [J96.01]       Precautions:                        ASSESSMENT :   DEFICITS/IMPAIRMENTS:   The patient is limited by impaired activity tolerance, decreased endurance, LOO, generalized weakness, impaired higher level balance, and overall impaired functional mobility. Pt received supine in bed, agreeable to participation with therapy. Pt tolerated mobility well, ambulating 30ft w/ CGAx1 before fatigue.  Destination. Phys Ther. 2021 Apr 4;101(4):[id number]. doi: 10.1093/[id number]. PMID: 58138693.  3. Miguel GENAO, Oksana LANDA, Jill S, Lex WRIGHT, Grace ROLLE. Activity Measure for Post-Acute Care \"6-Clicks\" Basic Mobility Scores Predict Discharge Destination After Acute Care Hospitalization in Select Patient Groups: A Retrospective, Observational Study. Arch Rehabil Res Clin Transl. 2022 Jul 16;4(3):543946. doi: [email or web address]/j.arrct.2022.110182. PMID: 37193564; PMCID: [id number].  4. Héctor VAZQUEZ, Regi S, Cynthia W, Machelle P. AM-PAC Short Forms Manual 4.0. Revised 2/2020.                                                                                                                                                                                                                              Pain Rating:  Denied c/o pain    Activity Tolerance:   O2 sats 92% post ambulation on 3L O2    After treatment:   Patient left in no apparent distress sitting up in chair, Call bell within reach, and Bed/ chair alarm activated    COMMUNICATION/EDUCATION:   The patient's plan of care was discussed with: occupational therapist and registered nurse    Patient Education  Education Given To: Patient  Education Provided: Role of Therapy  Education Method: Verbal  Barriers to Learning: None  Education Outcome: Verbalized understanding    Thank you for this referral.  Elisabeth Mckeon, PT, DPT  Minutes: 17      Physical Therapy Evaluation Charge Determination   History Examination Presentation Decision-Making   MEDIUM  Complexity : 1-2 comorbidities / personal factors will impact the outcome/ POC  MEDIUM Complexity : 3 Standardized tests and measures addressin body structure, function, activity limitation and / or participation in recreation  MEDIUM Complexity : Evolving with changing characteristics  AM-PAC  MEDIUM   Based on the above components, the patient evaluation is determined to be of the following complexity level: Medium

## 2024-06-19 NOTE — PLAN OF CARE
Respiratory Care Note    Patient started on PEP therapy at this time as ordered by physician. Patient gave good effort and performed well with coaching. Strong, dry NPC performed post therapy. No acute respiratory distress noted pre or post treatment.     Informed patient to ask the nurse to call the RT if she has any shortness of breath.

## 2024-06-19 NOTE — PROGRESS NOTES
End of Shift Note    Bedside shift change report given to INOCENCIA Powers (oncoming nurse) by Josefa Jeff RN (offgoing nurse).  Report included the following information SBAR, Kardex, ED Summary, Intake/Output, MAR, and Recent Results    Shift worked:  Night     Shift summary and any significant changes:     No acute changes overnight.      Concerns for physician to address:       Zone phone for oncoming shift:          Activity:     Number times ambulated in hallways past shift: 0  Number of times OOB to chair past shift: 0    Cardiac:   Cardiac Monitoring: Yes           Access:  Current line(s): PIV     Genitourinary:   Urinary status: voiding    Respiratory:      Chronic home O2 use?: YES  Incentive spirometer at bedside: NO       GI:     Current diet:  ADULT DIET; Easy to Chew  Passing flatus: YES  Tolerating current diet: YES       Pain Management:   Patient states pain is manageable on current regimen: YES    Skin:     Interventions: float heels, increase time out of bed, and PT/OT consult    Patient Safety:  Fall Score:    Interventions: bed/chair alarm, gripper socks, pt to call before getting OOB, and stay with me (per policy)       Length of Stay:  Expected LOS: 3  Actual LOS: 1      Josefa Jeff RN

## 2024-06-19 NOTE — PROGRESS NOTES
The following medication was adjusted following [facility] Renal Dosing Policy    Levofloxacin 750 mg IV q24h was adjusted to Levofloxacin 750 mg IV q48 h    For Estimated Creatinine Clearance: 39 mL/min (A) (based on SCr of 1.04 mg/dL (H)).    Thank you,  Hiro Baptiste, [facility]

## 2024-06-19 NOTE — PROGRESS NOTES
Pulmonary Note  Name: Mara Sharp     : 1955  Hospital: [facility]    Date: 2024 9:33 AM Date of Admission: 2024       Impression:   Plan:   -- Chronic hypoxic / hypercarbic respiratory failure - not clear to me that she is markedly different from her baseline functioning  -- MAC, bronchiectasis, hx pseudomonas  -- RA/dermatomyositis  -- Deconditioning  -- Headache  -- Anxiety -- o2, baseline 3l/m, may need more with exertion  -- Can switch back to her chronic azithro/moxifloxacin from our view  -- Mobilize as able  -- Held the IV diuretic given jump in creatinine  -- Echo to screen for PH = pending  -- ICS/LABA - home advair  -- Can quickly taper off steroids from our view  -- Flutter valve - recommend she take this home and use it regularly  -- DVT ppx    Once echo is done (screening for PH), ok for discharge from our view (HH vs rehab pending therapy recs).  Will need continued pulmonary followup with [facility], followup on any new diagnosis of pulmonary hypertension can be done outpatient with specialists as well.    Thanks for the consult!     CC:   Chief Complaint   Patient presents with    Headache      Interval History:  - Reports that she feels back to her usual baseline.  Working with therapy, appears comfortable on 3 l/m.  Deconditioned.    Initial HPI:   -  Presented to the ED with headache and dyspnea.  Reports dyspnea particularly worse with exertion, insidious onset over the past several weeks.  Quality breathlessness, modified by worse with exertion, associated intermittent nonproductive cough which is mild.  Known bronchiectasis/MAC, follows with Dr. Sanchez at [facility].  She is currently on second line MAC therapy with azithro and moxifloxacin.  She does have history pseudomonas.  Tells me that she is due for a repeat CT scan with him to ensure no significant progression of underlying disease, her last CT scans she reports were stable.  Denies chest pain or  Hypoglycemia Treatment Orders are not already active, then Nurse to enter Hypoglycemia Treatment Orders using Per Protocol without Cosign order mode, follow orders, and notify provider.      If Hypoglycemia Treatment Orders are already active, follow orders.  Hypoglycemia symptoms include but are not limited to: lightheadedness, sweating, tachycardia, and altered mental status.        Telemetry monitoring - 48 hour duration     Frequency: Until Discontinued     Number of Occurrences: 48 Hours    Telemetry monitoring - 72 hour duration     Frequency: Until Discontinued     Number of Occurrences: 72 Hours    Up as tolerated     Frequency: Until Discontinued     Number of Occurrences: Until Specified    Vital signs per unit routine     Frequency: Until Discontinued     Number of Occurrences: Until Specified   Code Status    Do Not Resuscitate     Frequency: Continuous     Number of Occurrences: Until Specified   OT    OT eval and treat     Frequency: 1 Time     Number of Occurrences: 1 Occurrences   PT    PT eval and treat     Frequency: 1 Time     Number of Occurrences: 1 Occurrences   Respiratory Care    Initiate Oxygen Therapy Protocol     Frequency: PRN     Number of Occurrences: Until Specified     Order Comments: Initiate oxygen therapy if the patient has 1) SpO2 is less than 90%, 2) Cyanosis, Chest Pain, Dyspnea, or Altered level of consciousness AND SpO2 checked and it is less than 90% or unobtainable, or 3) patient on Home oxygen.    To initiate oxygen therapy: nurse or RT enters Nasal cannula oxygen order using Per Protocol without Cosign order mode (if indication Home Oxygen then change L/min to same amount at home and change Wean to Room Air to No).    Notify provider if initiate oxygen therapy unless already on Home Oxygen.      Non-Heated High Flow Nasal Cannula     Frequency: Daily     Number of Occurrences: Until Specified     Order Comments: Oxygen Titration:  Enter Pulse oximetry, continuous order

## 2024-06-20 ENCOUNTER — APPOINTMENT (OUTPATIENT)
Facility: HOSPITAL | Age: 69
End: 2024-06-20
Payer: MEDICARE

## 2024-06-20 LAB
ANION GAP SERPL CALC-SCNC: 6 MMOL/L (ref 5–15)
BASOPHILS # BLD: 0 K/UL (ref 0–0.1)
BASOPHILS NFR BLD: 0 % (ref 0–1)
BUN SERPL-MCNC: 31 MG/DL (ref 6–20)
BUN/CREAT SERPL: 31 (ref 12–20)
CALCIUM SERPL-MCNC: 9.1 MG/DL (ref 8.5–10.1)
CHLORIDE SERPL-SCNC: 99 MMOL/L (ref 97–108)
CO2 SERPL-SCNC: 31 MMOL/L (ref 21–32)
CREAT SERPL-MCNC: 1 MG/DL (ref 0.55–1.02)
DIFFERENTIAL METHOD BLD: ABNORMAL
EOSINOPHIL # BLD: 0 K/UL (ref 0–0.4)
EOSINOPHIL NFR BLD: 0 % (ref 0–7)
ERYTHROCYTE [DISTWIDTH] IN BLOOD BY AUTOMATED COUNT: 13.7 % (ref 11.5–14.5)
GLUCOSE SERPL-MCNC: 156 MG/DL (ref 65–100)
HCT VFR BLD AUTO: 35.5 % (ref 35–47)
HGB BLD-MCNC: 11.1 G/DL (ref 11.5–16)
IMM GRANULOCYTES # BLD AUTO: 0.1 K/UL (ref 0–0.04)
IMM GRANULOCYTES NFR BLD AUTO: 1 % (ref 0–0.5)
LYMPHOCYTES # BLD: 1.3 K/UL (ref 0.8–3.5)
LYMPHOCYTES NFR BLD: 14 % (ref 12–49)
MCH RBC QN AUTO: 30.4 PG (ref 26–34)
MCHC RBC AUTO-ENTMCNC: 31.3 G/DL (ref 30–36.5)
MCV RBC AUTO: 97.3 FL (ref 80–99)
MONOCYTES # BLD: 1.2 K/UL (ref 0–1)
MONOCYTES NFR BLD: 14 % (ref 5–13)
NEUTS SEG # BLD: 6.5 K/UL (ref 1.8–8)
NEUTS SEG NFR BLD: 71 % (ref 32–75)
NRBC # BLD: 0 K/UL (ref 0–0.01)
NRBC BLD-RTO: 0 PER 100 WBC
PLATELET # BLD AUTO: 325 K/UL (ref 150–400)
PMV BLD AUTO: 10 FL (ref 8.9–12.9)
POTASSIUM SERPL-SCNC: 3.4 MMOL/L (ref 3.5–5.1)
RBC # BLD AUTO: 3.65 M/UL (ref 3.8–5.2)
SODIUM SERPL-SCNC: 136 MMOL/L (ref 136–145)
WBC # BLD AUTO: 9.1 K/UL (ref 3.6–11)

## 2024-06-20 PROCEDURE — 94640 AIRWAY INHALATION TREATMENT: CPT

## 2024-06-20 PROCEDURE — 97116 GAIT TRAINING THERAPY: CPT

## 2024-06-20 PROCEDURE — 85025 COMPLETE CBC W/AUTO DIFF WBC: CPT

## 2024-06-20 PROCEDURE — 36415 COLL VENOUS BLD VENIPUNCTURE: CPT

## 2024-06-20 PROCEDURE — 2580000003 HC RX 258: Performed by: INTERNAL MEDICINE

## 2024-06-20 PROCEDURE — 6360000002 HC RX W HCPCS: Performed by: PHYSICIAN ASSISTANT

## 2024-06-20 PROCEDURE — 2700000000 HC OXYGEN THERAPY PER DAY

## 2024-06-20 PROCEDURE — 80048 BASIC METABOLIC PNL TOTAL CA: CPT

## 2024-06-20 PROCEDURE — 6370000000 HC RX 637 (ALT 250 FOR IP): Performed by: PHYSICIAN ASSISTANT

## 2024-06-20 PROCEDURE — 2060000000 HC ICU INTERMEDIATE R&B

## 2024-06-20 PROCEDURE — 6370000000 HC RX 637 (ALT 250 FOR IP): Performed by: INTERNAL MEDICINE

## 2024-06-20 PROCEDURE — 6360000002 HC RX W HCPCS: Performed by: INTERNAL MEDICINE

## 2024-06-20 PROCEDURE — 93306 TTE W/DOPPLER COMPLETE: CPT

## 2024-06-20 RX ORDER — DOXYCYCLINE HYCLATE 100 MG
100 TABLET ORAL EVERY 12 HOURS SCHEDULED
Status: DISCONTINUED | OUTPATIENT
Start: 2024-06-20 | End: 2024-06-21 | Stop reason: HOSPADM

## 2024-06-20 RX ADMIN — ARFORMOTEROL TARTRATE 15 MCG: 15 SOLUTION RESPIRATORY (INHALATION) at 20:05

## 2024-06-20 RX ADMIN — QUETIAPINE FUMARATE 50 MG: 25 TABLET ORAL at 20:55

## 2024-06-20 RX ADMIN — PANTOPRAZOLE SODIUM 40 MG: 40 TABLET, DELAYED RELEASE ORAL at 06:56

## 2024-06-20 RX ADMIN — DOXYCYCLINE HYCLATE 100 MG: 100 TABLET, COATED ORAL at 20:56

## 2024-06-20 RX ADMIN — SODIUM CHLORIDE, PRESERVATIVE FREE 10 ML: 5 INJECTION INTRAVENOUS at 20:56

## 2024-06-20 RX ADMIN — FLUOXETINE 20 MG: 10 CAPSULE ORAL at 09:21

## 2024-06-20 RX ADMIN — POTASSIUM BICARBONATE 20 MEQ: 782 TABLET, EFFERVESCENT ORAL at 12:17

## 2024-06-20 RX ADMIN — VENLAFAXINE HYDROCHLORIDE 150 MG: 150 CAPSULE, EXTENDED RELEASE ORAL at 09:21

## 2024-06-20 RX ADMIN — BUDESONIDE 500 MCG: 0.5 INHALANT RESPIRATORY (INHALATION) at 20:05

## 2024-06-20 RX ADMIN — ENOXAPARIN SODIUM 30 MG: 100 INJECTION SUBCUTANEOUS at 09:24

## 2024-06-20 RX ADMIN — PREDNISONE 20 MG: 20 TABLET ORAL at 09:21

## 2024-06-20 RX ADMIN — PROPRANOLOL HYDROCHLORIDE 60 MG: 60 CAPSULE, EXTENDED RELEASE ORAL at 09:22

## 2024-06-20 RX ADMIN — ARFORMOTEROL TARTRATE 15 MCG: 15 SOLUTION RESPIRATORY (INHALATION) at 10:33

## 2024-06-20 RX ADMIN — SODIUM CHLORIDE 1000 MG: 900 INJECTION INTRAVENOUS at 15:09

## 2024-06-20 RX ADMIN — SODIUM CHLORIDE, PRESERVATIVE FREE 10 ML: 5 INJECTION INTRAVENOUS at 09:22

## 2024-06-20 RX ADMIN — ACETAMINOPHEN 650 MG: 325 TABLET ORAL at 12:17

## 2024-06-20 RX ADMIN — LEVOTHYROXINE SODIUM 50 MCG: 0.05 TABLET ORAL at 06:56

## 2024-06-20 RX ADMIN — BUDESONIDE 500 MCG: 0.5 INHALANT RESPIRATORY (INHALATION) at 10:33

## 2024-06-20 ASSESSMENT — PAIN SCALES - GENERAL
PAINLEVEL_OUTOF10: 0
PAINLEVEL_OUTOF10: 5
PAINLEVEL_OUTOF10: 0
PAINLEVEL_OUTOF10: 0

## 2024-06-20 ASSESSMENT — PAIN DESCRIPTION - DESCRIPTORS: DESCRIPTORS: ACHING

## 2024-06-20 ASSESSMENT — PAIN DESCRIPTION - ORIENTATION: ORIENTATION: POSTERIOR

## 2024-06-20 ASSESSMENT — PAIN DESCRIPTION - LOCATION: LOCATION: HEAD

## 2024-06-20 NOTE — PROGRESS NOTES
Attempted to schedule hospital follow up PCP appointment. Office  will contact the patient with appointment information per office protocol. [facility] placed [facility] information AVS for patient resource. Pending patient discharge. Yodit Love, Care Management Assistant

## 2024-06-20 NOTE — PROGRESS NOTES
Bedside shift change report given to INOCENCIA Barrera (oncoming nurse) by INOCENCIA Hawley (offgoing nurse). Report included the following information Nurse Handoff Report, Cardiac Rhythm NSR, and Quality Measures.      0856--Patient getting echo done currently.    End of Shift Note    Bedside shift change report given to INOCENCIA Hawley and INOCENCIA Bueno (oncoming nurse) by Holly Olivo RN (offgoing nurse).  Report included the following information SBAR, Kardex, Recent Results, and Cardiac Rhythm NSR    Shift worked:  Day shift     Shift summary and any significant changes:     Patient had echo done today. Noticed prolonged QTc on telemetry monitor, so notified pharmacist as patient was due to get levofloxacin. Pharmacist notified MD and antibiotics changed. Pharmacist talked to daughter as cephalosporins in allergy list, but from conversation with daughter, sounds like patient just felt jittery and no allergic reaction.    Patient encouraged to drink more fluids, as only urinated twice this shift and daughter said she tends to not drink enough water.      Concerns for physician to address:  --Discharge planning as patient is ready  to go home  --Patient needs to stay more hydrated   Zone phone for oncoming shift:   [phone]       Activity:     Number times ambulated in hallways past shift: 0  Number of times OOB to chair past shift: 0    Cardiac:   Cardiac Monitoring: Yes           Access:  Current line(s): PIV     Genitourinary:   Urinary status: voiding    Respiratory:      Chronic home O2 use?: YES  Incentive spirometer at bedside:        GI:     Current diet:  ADULT DIET; Easy to Chew  Passing flatus:   Tolerating current diet: YES       Pain Management:   Patient states pain is manageable on current regimen: YES    Skin:     Interventions: increase time out of bed and nutritional support    Patient Safety:  Fall Score:    Interventions: bed/chair alarm, gripper socks, and pt to call before getting OOB       Length of Stay:  Expected

## 2024-06-20 NOTE — PLAN OF CARE
Problem: Physical Therapy - Adult  Goal: By Discharge: Performs mobility at highest level of function for planned discharge setting.  See evaluation for individualized goals.  Description: FUNCTIONAL STATUS PRIOR TO ADMISSION: Admits to relying on support of furniture during household ambulation. Use of rollator walker for longer, community distances. However, states she mostly spends her day in bed, only mobilizing OOB if needing to use bathroom or make a meal for herself. States she ambulates ~30ft at most before needing a rest break. Wears 3L O2 at baseline. Denies history of falls.     HOME SUPPORT PRIOR TO ADMISSION: The patient lived with  however  is unable to physically assist. States she and  have paid caregiver 7hrs/day Monday-Friday. Step-daughters assist on the weekends/nighttime as needed.    Physical Therapy Goals  Initiated 6/19/2024  1.  Patient will move from supine to sit and sit to supine, scoot up and down, and roll side to side in bed with independence within 7 day(s).    2.  Patient will perform sit to stand with modified independence within 7 day(s).  3.  Patient will transfer from bed to chair and chair to bed with modified independence using the least restrictive device within 7 day(s).  4.  Patient will ambulate with modified independence for 50 feet with the least restrictive device within 7 day(s).   Outcome: Progressing   PHYSICAL THERAPY TREATMENT    Patient: Mara Sharp (69 y.o. female)  Date: 6/20/2024  Diagnosis: Acute hypoxemic respiratory failure (HCC) [J96.01] Acute hypoxemic respiratory failure (HCC)      Precautions:                        ASSESSMENT:  Patient continues to benefit from skilled PT services and is slowly progressing towards goals. Pt with improved strength, balance, and overall mobility however remains limited by baseline respiratory status and impairments in activity tolerance. Pt mobilized with SBAx1 throughout, ambulating 40ft without  device before fatigue. Gait speed decreased with shuffled gait pattern however steady overall. VSS on 3L O2 throughout. Pt remains appropriate to return home w/ HHPT and caregiver assist at discharge.          PLAN:  Patient continues to benefit from skilled intervention to address the above impairments.  Continue treatment per established plan of care.    Recommendation for discharge: (in order for the patient to meet his/her long term goals): Therapy 2x a week in the home    Other factors to consider for discharge:  caregiver burden, respiratory status    IF patient discharges home will need the following DME: patient owns DME required for discharge       SUBJECTIVE:   Patient stated, \"I want to get back to bed.\"    OBJECTIVE DATA SUMMARY:   Critical Behavior:          Functional Mobility Training:  Bed Mobility:  Bed Mobility Training  Bed Mobility Training: Yes  Overall Level of Assistance: Independent  Rolling: Independent  Supine to Sit: Independent  Sit to Supine: Independent  Scooting: Independent  Transfers:  Transfer Training  Transfer Training: Yes  Sit to Stand: Stand-by assistance  Stand to Sit: Stand-by assistance  Toilet Transfer: Stand-by assistance  Balance:  Balance  Sitting: Intact  Standing: Impaired  Standing - Static: Good  Standing - Dynamic: Good;Fair   Ambulation/Gait Training:     Gait  Gait Training: Yes  Overall Level of Assistance: Stand-by assistance  Distance (ft): 40 Feet  Assistive Device: Gait belt  Base of Support: Narrowed  Speed/Tanesha: Pace decreased (< 100 feet/min)  Step Length: Right shortened;Left shortened  Gait Abnormalities: Decreased step clearance        Neuro Re-Education:              Pain Rating:  Denied c/o pain    Activity Tolerance:   VSS throughout, pt fatigues quickly    After treatment:   Patient left in no apparent distress in bed, Call bell within reach, Bed/ chair alarm activated, and Caregiver / family present      COMMUNICATION/EDUCATION:   The patient's

## 2024-06-20 NOTE — PLAN OF CARE
Bedside and Verbal shift change report given to INOCENCIA Lopez (oncoming nurse) by INOCENCIA Powers (offgoing nurse). Report included the following information Adult Overview, Intake/Output, MAR, Recent Results, Med Rec Status, Alarm Parameters, Quality Measures, and Neuro Assessment.   /63   Pulse 77   Temp 98.1 °F (36.7 °C) (Oral)   Resp 19   Ht 1.613 m (5' 3.5\")   Wt 49.9 kg (110 lb)   SpO2 91%   BMI 19.18 kg/m²     End of Shift Note    Bedside shift change report given to ....... (oncoming nurse) by John Biswas RN (offgoing nurse).  Report included the following information SBAR, Intake/Output, MAR, Recent Results, Med Rec Status, Alarm Parameters , and Quality Measures    Shift worked:  Night     Shift summary and any significant changes:     No acute events overnight.    Concerns for physician to address:  .......     Zone phone for oncoming shift:  [phone]       Activity:     Number times ambulated in hallways past shift: 0  Number of times OOB to chair past shift: 1    Cardiac:   Cardiac Monitoring: Yes           Access:  Current line(s): PIV     Genitourinary:   Urinary status: voiding    Respiratory:      Chronic home O2 use?: YES  Incentive spirometer at bedside: NO       GI:     Current diet:  ADULT DIET; Easy to Chew  Passing flatus: YES  Tolerating current diet: YES       Pain Management:   Patient states pain is manageable on current regimen: YES    Skin:     Interventions: increase time out of bed    Patient Safety:  Fall Score:    Interventions: bed/chair alarm, gripper socks, and pt to call before getting OOB       Length of Stay:  Expected LOS: 3  Actual LOS: 1      John Biswas RN      CARE PLAN  Problem: Discharge Planning  Goal: Discharge to home or other facility with appropriate resources  6/19/2024 2040 by John Biswas RN  Outcome: Progressing  6/19/2024 2025 by Priya Gardner RN  Outcome: Progressing     Problem: Respiratory - Adult  Goal: Achieves optimal ventilation and

## 2024-06-20 NOTE — PROGRESS NOTES
Hospitalist Progress Note    NAME:   Mara Sharp   : 1955   MRN: [id number]     Date/Time: 2024 4:20 PM  Patient PCP: Nenita Poe MD    Estimated discharge date:   Barriers: Echo      Assessment / Plan:    Acute on chronic hypoxic respiratory failure  Hx of MAC  Procalcitonin less than 0.05   On 3L of oxygen on baseline  In ED, desaturated to 86% on 5L of supplemental oxygen- oxygen requirement went upto 8L- now back to 3L of supplemental oxygen  Continue levofloxacin for possible pneumonia  IV Lasix was held  Will follow ECHO  Will change iv steroids to po prednisone and taper off steroids as per pulmonary recs  Appreciate pulmonary consult-okay for discharge from their standpoint      Anxiety:  Resume home meds  Valium prn, seroquel, venlafaxine, fluoxetine      Headache:  Resume home meds- fioricet prn     Deconditioning      Medical Decision Making:   I personally reviewed labs: CBC, BMP  I personally reviewed imaging:  I personally reviewed EKG:  Toxic drug monitoring:   Discussed case with: Discussed at length with family likely will be discharged on  after echo.  Patient should reschedule imaging, pulmonology, and neurology appointments.        Code Status: DNR  DVT Prophylaxis: Lovenox  GI Prophylaxis:    Subjective:     Chief Complaint / Reason for Physician Visit  Discussed with RN events overnight.   Family at bedside asking after outpatient CT.  Patient [place] [facility] [place]  Probably would do best unfortunately to schedule so it is all in the same system.  Unfortunately missed an appointment with a neurologist for prolonged evaluation for dementia.  Reassured family she is already showing early signs of dementia however she cannot be reschedule for further assessment.    Objective:     VITALS:   Last 24hrs VS reviewed since prior progress note. Most recent are:  Patient Vitals for the past 24 hrs:   BP Temp Temp src Pulse Resp SpO2  Height Weight   06/20/24 1510 112/65 97.4 °F (36.3 °C) Axillary 76 23 94 % -- --   06/20/24 1124 125/68 97.4 °F (36.3 °C) Oral 73 21 95 % -- --   06/20/24 1033 -- -- -- -- -- 96 % -- --   06/20/24 0922 128/72 -- -- 76 -- -- -- --   06/20/24 0853 129/65 -- -- 78 -- -- 1.613 m (5' 3.5\") 49.9 kg (110 lb)   06/20/24 0715 129/65 97.5 °F (36.4 °C) Oral 78 21 (!) 85 % -- --   06/20/24 0300 (!) 117/98 97.9 °F (36.6 °C) Oral 76 20 98 % -- --   06/19/24 2246 110/65 98 °F (36.7 °C) Oral 69 19 93 % -- --   06/19/24 1910 129/63 98.1 °F (36.7 °C) Oral 77 19 91 % -- --           Intake/Output Summary (Last 24 hours) at 6/20/2024 1620  Last data filed at 6/20/2024 0322  Gross per 24 hour   Intake 370 ml   Output 250 ml   Net 120 ml          I had a face to face encounter and independently examined this patient on 6/20/2024, as outlined below:  PHYSICAL EXAM:  General: Alert, cooperative  EENT:  EOMI. Anicteric sclerae.  Resp:  CTA bilaterally, no wheezing or rales.  No accessory muscle use  CV:  Regular  rhythm,  No edema  GI:  Soft, Non distended, Non tender.  +Bowel sounds  Neurologic:  Alert and oriented X 3, normal speech,   Psych:   Good insight. Not anxious nor agitated  Skin:  No rashes.  No jaundice    Reviewed most current lab test results and cultures  YES  Reviewed most current radiology test results   YES  Review and summation of old records today    NO  Reviewed patient's current orders and MAR    YES  PMH/SH reviewed - no change compared to H&P    Procedures: see electronic medical records for all procedures/Xrays and details which were not copied into this note but were reviewed prior to creation of Plan.      LABS:  I reviewed today's most current labs and imaging studies.  Pertinent labs include:  Recent Labs     06/18/24  0551 06/19/24  0312 06/20/24  0127   WBC 6.9 7.1 9.1   HGB 12.0 11.5 11.1*   HCT 39.2 36.4 35.5    342 325       Recent Labs     06/18/24  0551 06/18/24  0656 06/19/24 0312 06/20/24  0127

## 2024-06-20 NOTE — PLAN OF CARE
Problem: Discharge Planning  Goal: Discharge to home or other facility with appropriate resources  Outcome: Progressing     Problem: Pain  Goal: Verbalizes/displays adequate comfort level or baseline comfort level  Outcome: Progressing     Problem: Respiratory - Adult  Goal: Achieves optimal ventilation and oxygenation  6/20/2024 1050 by Holly Olivo RN  Outcome: Progressing  6/20/2024 1035 by Josefa Lucero, RT  Outcome: Progressing  6/19/2024 2239 by Lluvia Oliva, RT  Outcome: Progressing  6/19/2024 2238 by Lluvia Oliva, RT  Outcome: Progressing     Problem: Safety - Adult  Goal: Free from fall injury  Outcome: Progressing     Problem: Skin/Tissue Integrity  Goal: Absence of new skin breakdown  Description: 1.  Monitor for areas of redness and/or skin breakdown  2.  Assess vascular access sites hourly  3.  Every 4-6 hours minimum:  Change oxygen saturation probe site  4.  Every 4-6 hours:  If on nasal continuous positive airway pressure, respiratory therapy assess nares and determine need for appliance change or resting period.  Outcome: Progressing

## 2024-06-21 VITALS
DIASTOLIC BLOOD PRESSURE: 55 MMHG | BODY MASS INDEX: 17.86 KG/M2 | OXYGEN SATURATION: 92 % | SYSTOLIC BLOOD PRESSURE: 123 MMHG | RESPIRATION RATE: 19 BRPM | HEART RATE: 85 BPM | WEIGHT: 104.6 LBS | HEIGHT: 64 IN | TEMPERATURE: 97.5 F

## 2024-06-21 LAB
ANION GAP SERPL CALC-SCNC: 3 MMOL/L (ref 5–15)
BASOPHILS # BLD: 0 K/UL (ref 0–0.1)
BASOPHILS NFR BLD: 0 % (ref 0–1)
BUN SERPL-MCNC: 26 MG/DL (ref 6–20)
BUN/CREAT SERPL: 35 (ref 12–20)
CALCIUM SERPL-MCNC: 9.3 MG/DL (ref 8.5–10.1)
CHLORIDE SERPL-SCNC: 100 MMOL/L (ref 97–108)
CO2 SERPL-SCNC: 35 MMOL/L (ref 21–32)
CREAT SERPL-MCNC: 0.74 MG/DL (ref 0.55–1.02)
DIFFERENTIAL METHOD BLD: ABNORMAL
ECHO AV AREA PEAK VELOCITY: 2.6 CM2
ECHO AV AREA VTI: 2.4 CM2
ECHO AV AREA/BSA VTI: 1.6 CM2/M2
ECHO AV CUSP MM: 1.7 CM
ECHO AV MEAN GRADIENT: 2 MMHG
ECHO AV MEAN VELOCITY: 0.7 M/S
ECHO AV PEAK GRADIENT: 4 MMHG
ECHO AV PEAK GRADIENT: 4 MMHG
ECHO AV PEAK VELOCITY: 1 M/S
ECHO AV PEAK VELOCITY: 1 M/S
ECHO AV VTI: 17.3 CM
ECHO BSA: 1.5 M2
ECHO LA DIAMETER INDEX: 1.92 CM/M2
ECHO LA DIAMETER: 2.9 CM
ECHO LA VOL A-L A2C: 29 ML (ref 22–52)
ECHO LA VOL A-L A4C: 49 ML (ref 22–52)
ECHO LA VOL MOD A2C: 27 ML (ref 22–52)
ECHO LA VOL MOD A4C: 45 ML (ref 22–52)
ECHO LA VOLUME AREA LENGTH: 43 ML
ECHO LA VOLUME INDEX A-L A2C: 19 ML/M2 (ref 16–34)
ECHO LA VOLUME INDEX A-L A4C: 32 ML/M2 (ref 16–34)
ECHO LA VOLUME INDEX AREA LENGTH: 28 ML/M2 (ref 16–34)
ECHO LA VOLUME INDEX MOD A2C: 18 ML/M2 (ref 16–34)
ECHO LA VOLUME INDEX MOD A4C: 30 ML/M2 (ref 16–34)
ECHO LV E' LATERAL VELOCITY: 8 CM/S
ECHO LV E' SEPTAL VELOCITY: 4 CM/S
ECHO LV FRACTIONAL SHORTENING: 40 % (ref 28–44)
ECHO LV INTERNAL DIMENSION DIASTOLE INDEX: 2.32 CM/M2
ECHO LV INTERNAL DIMENSION DIASTOLIC: 3.5 CM (ref 3.9–5.3)
ECHO LV INTERNAL DIMENSION SYSTOLIC INDEX: 1.39 CM/M2
ECHO LV INTERNAL DIMENSION SYSTOLIC: 2.1 CM
ECHO LV IVSD: 1.1 CM (ref 0.6–0.9)
ECHO LV MASS 2D: 119 G (ref 67–162)
ECHO LV MASS INDEX 2D: 78.8 G/M2 (ref 43–95)
ECHO LV POSTERIOR WALL DIASTOLIC: 1.1 CM (ref 0.6–0.9)
ECHO LV RELATIVE WALL THICKNESS RATIO: 0.63
ECHO LVOT AREA: 3.1 CM2
ECHO LVOT AV VTI INDEX: 0.76
ECHO LVOT DIAM: 2 CM
ECHO LVOT MEAN GRADIENT: 2 MMHG
ECHO LVOT PEAK GRADIENT: 3 MMHG
ECHO LVOT PEAK GRADIENT: 3 MMHG
ECHO LVOT PEAK VELOCITY: 0.8 M/S
ECHO LVOT PEAK VELOCITY: 0.8 M/S
ECHO LVOT STROKE VOLUME INDEX: 27.2 ML/M2
ECHO LVOT SV: 41.1 ML
ECHO LVOT VTI: 13.1 CM
ECHO MV A VELOCITY: 0.6 M/S
ECHO MV AREA VTI: 2.7 CM2
ECHO MV E DECELERATION TIME (DT): 343 MS
ECHO MV E VELOCITY: 0.5 M/S
ECHO MV E/A RATIO: 0.83
ECHO MV E/E' LATERAL: 6.25
ECHO MV E/E' RATIO (AVERAGED): 9.38
ECHO MV E/E' SEPTAL: 12.5
ECHO MV LVOT VTI INDEX: 1.16
ECHO MV MAX VELOCITY: 0.6 M/S
ECHO MV MEAN GRADIENT: 1 MMHG
ECHO MV MEAN VELOCITY: 0.4 M/S
ECHO MV PEAK GRADIENT: 2 MMHG
ECHO MV VTI: 15.2 CM
ECHO PV MAX VELOCITY: 0.7 M/S
ECHO PV PEAK GRADIENT: 2 MMHG
ECHO RV FREE WALL PEAK S': 23 CM/S
ECHO RV INTERNAL DIMENSION: 3.8 CM
EKG ATRIAL RATE: 77 BPM
EKG DIAGNOSIS: NORMAL
EKG P AXIS: 89 DEGREES
EKG P-R INTERVAL: 154 MS
EKG Q-T INTERVAL: 388 MS
EKG QRS DURATION: 80 MS
EKG QTC CALCULATION (BAZETT): 439 MS
EKG R AXIS: -27 DEGREES
EKG T AXIS: 109 DEGREES
EKG VENTRICULAR RATE: 77 BPM
EOSINOPHIL # BLD: 0 K/UL (ref 0–0.4)
EOSINOPHIL NFR BLD: 0 % (ref 0–7)
ERYTHROCYTE [DISTWIDTH] IN BLOOD BY AUTOMATED COUNT: 13.9 % (ref 11.5–14.5)
GLUCOSE SERPL-MCNC: 106 MG/DL (ref 65–100)
HCT VFR BLD AUTO: 35.2 % (ref 35–47)
HGB BLD-MCNC: 11 G/DL (ref 11.5–16)
IMM GRANULOCYTES # BLD AUTO: 0 K/UL (ref 0–0.04)
IMM GRANULOCYTES NFR BLD AUTO: 0 % (ref 0–0.5)
LYMPHOCYTES # BLD: 2.4 K/UL (ref 0.8–3.5)
LYMPHOCYTES NFR BLD: 28 % (ref 12–49)
MCH RBC QN AUTO: 30.3 PG (ref 26–34)
MCHC RBC AUTO-ENTMCNC: 31.3 G/DL (ref 30–36.5)
MCV RBC AUTO: 97 FL (ref 80–99)
MONOCYTES # BLD: 1.6 K/UL (ref 0–1)
MONOCYTES NFR BLD: 19 % (ref 5–13)
NEUTS SEG # BLD: 4.5 K/UL (ref 1.8–8)
NEUTS SEG NFR BLD: 53 % (ref 32–75)
NRBC # BLD: 0 K/UL (ref 0–0.01)
NRBC BLD-RTO: 0 PER 100 WBC
PLATELET # BLD AUTO: 328 K/UL (ref 150–400)
PMV BLD AUTO: 9.8 FL (ref 8.9–12.9)
POTASSIUM SERPL-SCNC: 3.6 MMOL/L (ref 3.5–5.1)
RBC # BLD AUTO: 3.63 M/UL (ref 3.8–5.2)
SODIUM SERPL-SCNC: 138 MMOL/L (ref 136–145)
WBC # BLD AUTO: 8.5 K/UL (ref 3.6–11)

## 2024-06-21 PROCEDURE — 6360000002 HC RX W HCPCS: Performed by: PHYSICIAN ASSISTANT

## 2024-06-21 PROCEDURE — 6370000000 HC RX 637 (ALT 250 FOR IP): Performed by: INTERNAL MEDICINE

## 2024-06-21 PROCEDURE — 85025 COMPLETE CBC W/AUTO DIFF WBC: CPT

## 2024-06-21 PROCEDURE — 6370000000 HC RX 637 (ALT 250 FOR IP): Performed by: PHYSICIAN ASSISTANT

## 2024-06-21 PROCEDURE — 94640 AIRWAY INHALATION TREATMENT: CPT

## 2024-06-21 PROCEDURE — 80048 BASIC METABOLIC PNL TOTAL CA: CPT

## 2024-06-21 PROCEDURE — 36415 COLL VENOUS BLD VENIPUNCTURE: CPT

## 2024-06-21 PROCEDURE — 2580000003 HC RX 258: Performed by: INTERNAL MEDICINE

## 2024-06-21 PROCEDURE — 97116 GAIT TRAINING THERAPY: CPT

## 2024-06-21 PROCEDURE — 6360000002 HC RX W HCPCS: Performed by: INTERNAL MEDICINE

## 2024-06-21 RX ORDER — METHYLPREDNISOLONE 4 MG/1
TABLET ORAL
Qty: 1 KIT | Refills: 0 | Status: SHIPPED | OUTPATIENT
Start: 2024-06-21 | End: 2024-06-27

## 2024-06-21 RX ORDER — QUETIAPINE FUMARATE 50 MG/1
50 TABLET, FILM COATED ORAL NIGHTLY
Qty: 60 TABLET | Refills: 0 | Status: SHIPPED | OUTPATIENT
Start: 2024-06-21

## 2024-06-21 RX ORDER — PANTOPRAZOLE SODIUM 40 MG/1
40 TABLET, DELAYED RELEASE ORAL
Qty: 30 TABLET | Refills: 3 | Status: SHIPPED | OUTPATIENT
Start: 2024-06-22

## 2024-06-21 RX ORDER — CEFDINIR 300 MG/1
300 CAPSULE ORAL 2 TIMES DAILY
Qty: 10 CAPSULE | Refills: 0 | Status: SHIPPED | OUTPATIENT
Start: 2024-06-21 | End: 2024-06-26

## 2024-06-21 RX ORDER — FLUOXETINE HYDROCHLORIDE 20 MG/1
40 CAPSULE ORAL DAILY
Qty: 30 CAPSULE | Refills: 0 | Status: SHIPPED | OUTPATIENT
Start: 2024-06-22

## 2024-06-21 RX ORDER — DOXYCYCLINE HYCLATE 100 MG
100 TABLET ORAL EVERY 12 HOURS SCHEDULED
Qty: 5 TABLET | Refills: 0 | Status: SHIPPED | OUTPATIENT
Start: 2024-06-21 | End: 2024-06-24

## 2024-06-21 RX ORDER — BENZONATATE 100 MG/1
100 CAPSULE ORAL 3 TIMES DAILY PRN
Qty: 30 CAPSULE | Refills: 0 | Status: SHIPPED | OUTPATIENT
Start: 2024-06-21 | End: 2024-07-01

## 2024-06-21 RX ADMIN — LEVOTHYROXINE SODIUM 50 MCG: 0.05 TABLET ORAL at 06:08

## 2024-06-21 RX ADMIN — FLUOXETINE 20 MG: 10 CAPSULE ORAL at 09:21

## 2024-06-21 RX ADMIN — SODIUM CHLORIDE, PRESERVATIVE FREE 10 ML: 5 INJECTION INTRAVENOUS at 09:22

## 2024-06-21 RX ADMIN — VENLAFAXINE HYDROCHLORIDE 150 MG: 150 CAPSULE, EXTENDED RELEASE ORAL at 09:21

## 2024-06-21 RX ADMIN — PREDNISONE 20 MG: 20 TABLET ORAL at 09:21

## 2024-06-21 RX ADMIN — PANTOPRAZOLE SODIUM 40 MG: 40 TABLET, DELAYED RELEASE ORAL at 06:08

## 2024-06-21 RX ADMIN — ARFORMOTEROL TARTRATE 15 MCG: 15 SOLUTION RESPIRATORY (INHALATION) at 08:44

## 2024-06-21 RX ADMIN — BUDESONIDE 500 MCG: 0.5 INHALANT RESPIRATORY (INHALATION) at 08:44

## 2024-06-21 RX ADMIN — DOXYCYCLINE HYCLATE 100 MG: 100 TABLET, COATED ORAL at 09:22

## 2024-06-21 RX ADMIN — ENOXAPARIN SODIUM 30 MG: 100 INJECTION SUBCUTANEOUS at 09:22

## 2024-06-21 RX ADMIN — PROPRANOLOL HYDROCHLORIDE 60 MG: 60 CAPSULE, EXTENDED RELEASE ORAL at 09:22

## 2024-06-21 ASSESSMENT — PAIN SCALES - GENERAL
PAINLEVEL_OUTOF10: 0

## 2024-06-21 NOTE — DISCHARGE INSTRUCTIONS
Learning About Respiratory Failure  What is respiratory failure?     Your lungs give your body oxygen when you breathe. They also remove the waste product carbon dioxide from your body. Respiratory failure happens when the lungs aren't able to move enough oxygen into the blood and move enough carbon dioxide out of the blood. This is a severe problem that may need to be treated in intensive care.  Many things can cause lung failure. They include pneumonia and other serious infections. The doctor will look for the cause of the problem and then treat it if possible.  How is it treated?  To help your lungs get enough oxygen, your doctor may use a few devices. These vary in how much oxygen they give and how they help you breathe. They are:  A nasal cannula (say \"NORTH-arturoh-vandana\"). This is a thin tube with two prongs that fit just inside your nose.  A special face mask that delivers more oxygen. There are different kinds. A face mask with a bag on one end is called a non-rebreather mask.  A high-flow nasal cannula. It can warm and wet the oxygen it delivers, so getting high amounts of oxygen feels better.  A face mask that gives you oxygen through a bilevel positive airway pressure (BiPAP) machine. It uses different air pressures when you breathe in and out.  A ventilator that helps you breathe or that breathes for you. It controls how much air and oxygen flow into your lungs. This machine requires a breathing tube in your windpipe. It can be uncomfortable, so you may get medicine to help you relax or sleep. You also will get fluid through an intravenous (I.V.) tube.  You will get regular tests to see how much oxygen is in your blood. Tests also can show how well the lungs are working. These tests help your doctor adjust the machines and the oxygen supply.  The doctor will watch you closely.  Current as of: August 6, 2023  Content Version: 14.1  © 2006-2024 [employer], [employer].   Care instructions adapted under

## 2024-06-21 NOTE — PLAN OF CARE
Problem: Physical Therapy - Adult  Goal: By Discharge: Performs mobility at highest level of function for planned discharge setting.  See evaluation for individualized goals.  Description: FUNCTIONAL STATUS PRIOR TO ADMISSION: Admits to relying on support of furniture during household ambulation. Use of rollator walker for longer, community distances. However, states she mostly spends her day in bed, only mobilizing OOB if needing to use bathroom or make a meal for herself. States she ambulates ~30ft at most before needing a rest break. Wears 3L O2 at baseline. Denies history of falls.     HOME SUPPORT PRIOR TO ADMISSION: The patient lived with  however  is unable to physically assist. States she and  have paid caregiver 7hrs/day Monday-Friday. Step-daughters assist on the weekends/nighttime as needed.    Physical Therapy Goals  Initiated 6/19/2024  1.  Patient will move from supine to sit and sit to supine, scoot up and down, and roll side to side in bed with independence within 7 day(s).    2.  Patient will perform sit to stand with modified independence within 7 day(s).  3.  Patient will transfer from bed to chair and chair to bed with modified independence using the least restrictive device within 7 day(s).  4.  Patient will ambulate with modified independence for 50 feet with the least restrictive device within 7 day(s).   Outcome: Progressing   PHYSICAL THERAPY TREATMENT    Patient: Mara Sharp (69 y.o. female)  Date: 6/21/2024  Diagnosis: Acute hypoxemic respiratory failure (HCC) [J96.01] Acute hypoxemic respiratory failure (HCC)      Precautions:                        ASSESSMENT:  Patient continues to benefit from skilled PT services and is progressing towards goals. Pt received sitting in bedside chair, agreeable to participate in therapy. Received on baseline 3L, saturating high 90s throughout mobility. Good tolerance to mobility this session. Pt did report dizziness during initial  of Assistance: Stand-by assistance  Distance (ft): 40 Feet (20x2)  Assistive Device: Gait belt  Interventions: Safety awareness training;Verbal cues  Base of Support: Narrowed  Speed/Tanesha: Pace decreased (< 100 feet/min)  Step Length: Left shortened;Right shortened  Gait Abnormalities: Decreased step clearance;Trunk sway increased        Neuro Re-Education:                    Pain Rating:    Pain Intervention(s):       Activity Tolerance:   Good    After treatment:   Patient left in no apparent distress sitting up in chair, Call bell within reach, Bed/ chair alarm activated, and Updated patient's board on functional status and mobility recommendations      COMMUNICATION/EDUCATION:   The patient's plan of care was discussed with: registered nurse    Patient Education  Education Given To: Patient  Education Provided: Role of Therapy;Plan of Care;Transfer Training;Energy Conservation  Education Method: Verbal  Barriers to Learning: None  Education Outcome: Verbalized understanding;Demonstrated understanding      Cleo Victor PTA  Minutes: 24

## 2024-06-21 NOTE — CARE COORDINATION
CM discussed the  patient's ride with the patient and Giol who can be reached at [phone] at the bedside. The family will provide a ride as the patient does not meet the criteria to have medical necessary stretcher ride.     Yanelis Martinez RN  Case Management  [phone]

## 2024-06-21 NOTE — DISCHARGE SUMMARY
97.5 °F (36.4 °C) Oral 78 20 93 % --   06/21/24 1000 -- -- -- -- -- -- 47.4 kg (104 lb 9.6 oz)   06/21/24 0846 -- -- -- -- -- 95 % --   06/21/24 0751 125/64 98.2 °F (36.8 °C) Oral 81 21 94 % --   06/21/24 0325 116/69 98.1 °F (36.7 °C) Oral 82 20 95 % --   06/21/24 0320 -- -- -- 75 24 98 % --   06/21/24 0315 -- -- -- 77 24 96 % --   06/21/24 0310 -- -- -- 77 20 96 % --   06/21/24 0305 -- -- -- 73 18 95 % --   06/21/24 0300 -- -- -- 68 19 97 % --   06/21/24 0255 -- -- -- 72 21 94 % --   06/21/24 0250 -- -- -- 76 24 94 % --   06/20/24 2250 123/68 97.3 °F (36.3 °C) Oral 72 20 97 % --   06/20/24 2245 -- -- -- 78 24 98 % --   06/20/24 2240 -- -- -- 71 18 96 % --   06/20/24 2235 -- -- -- 70 20 96 % --   06/20/24 2230 -- -- -- 74 20 96 % --   06/20/24 2225 -- -- -- 76 24 96 % --   06/20/24 2220 -- -- -- 74 21 96 % --   06/20/24 2215 -- -- -- 78 23 97 % --   06/20/24 2015 -- -- -- 79 22 -- --   06/20/24 2005 -- -- -- 88 22 98 % --   06/20/24 2000 -- -- -- 82 26 -- --   06/20/24 1945 -- -- -- 81 21 -- --   06/20/24 1930 -- -- -- 81 18 -- --   06/20/24 1916 -- -- -- -- 20 -- --   06/20/24 1915 111/78 98.5 °F (36.9 °C) Oral 76 20 -- --   06/20/24 1900 -- -- -- 75 23 -- --   06/20/24 1845 -- -- -- 75 20 -- --   06/20/24 1830 -- -- -- 84 29 -- --   06/20/24 1510 112/65 97.4 °F (36.3 °C) Axillary 76 23 94 % --       Gen:    Not in distress  Chest: Clear lungs  CVS:   Regular rhythm.  No edema  Abd:  Soft, not distended, not tender  Neuro: awake, moving all exts    Discharge/Recent Laboratory Results:  Recent Labs     06/19/24  0312 06/20/24  0127 06/21/24  0322      < > 138   K 3.4*   < > 3.6      < > 100   CO2 34*   < > 35*   BUN 24*   < > 26*   CREATININE 1.04*   < > 0.74   GLUCOSE 154*   < > 106*   CALCIUM 9.1   < > 9.3   PHOS 4.0  --   --    MG 1.5*  --   --     < > = values in this interval not displayed.     Recent Labs     06/21/24 0322   HGB 11.0*   HCT 35.2   WBC 8.5          Discharge Medications:      Medication List        START taking these medications      benzonatate 100 MG capsule  Commonly known as: TESSALON  Take 1 capsule by mouth 3 times daily as needed for Cough     cefdinir 300 MG capsule  Commonly known as: OMNICEF  Take 1 capsule by mouth 2 times daily for 5 days     doxycycline hyclate 100 MG tablet  Commonly known as: VIBRA-TABS  Take 1 tablet by mouth every 12 hours for 5 doses     methylPREDNISolone 4 MG tablet  Commonly known as: MEDROL (RADHA)  Take by mouth.     pantoprazole 40 MG tablet  Commonly known as: PROTONIX  Take 1 tablet by mouth every morning (before breakfast)  Start taking on: June 22, 2024     traMADol 50 MG tablet  Commonly known as: ULTRAM            CONTINUE taking these medications      cyanocobalamin 1000 MCG/ML injection     diazePAM 5 MG tablet  Commonly known as: VALIUM     levothyroxine 50 MCG tablet  Commonly known as: SYNTHROID     propranolol 60 MG extended release capsule  Commonly known as: INDERAL LA     venlafaxine 150 MG extended release capsule  Commonly known as: EFFEXOR XR     VITAMIN B COMPLEX PO            STOP taking these medications      GARLIC PO     zolpidem 10 MG tablet  Commonly known as: AMBIEN               Where to Get Your Medications        These medications were sent to [id number] [address] [phone] - [facility] [phone]  [address] [place] [address] [address] [address]      Phone: [phone]   benzonatate 100 MG capsule  cefdinir 300 MG capsule  doxycycline hyclate 100 MG tablet  methylPREDNISolone 4 MG tablet  pantoprazole 40 MG tablet             DISPOSITION:    Home with Family:       Home with HH/PT/OT/RN:    SNF/LTC:    YADI:    OTHER:            Code status:   Recommended diet:  easy to chew   Recommended activity: activity as tolerated  Wound care: See surgical/procedure care instructions      Follow up with:   PCP : Nenita Poe MD Cash, Lisa Rene Otto, MD  [address] [place] [address]

## 2024-06-21 NOTE — PROGRESS NOTES
Occupational Therapy not:    Chart reviewed and patient received in bed. She politely declined OT at this time reporting increased fatigue from earlier PT session. She reported no concerns with completing ADLs once home. Will defer and follow up with on Monday.    Jessie Conn, OTR/L, OTD

## 2024-06-21 NOTE — PROGRESS NOTES
Bedside and Verbal shift change report given to INOCENCIA Good (oncoming nurse) by INOCENCIA Hawley and INOCENCIA Bueno (offgoing nurse). Report included the following information Nurse Handoff Report, Intake/Output, MAR, Recent Results, and Cardiac Rhythm SR .     Problem: Discharge Planning  Goal: Discharge to home or other facility with appropriate resources  Outcome: Progressing  Flowsheets (Taken 6/20/2024 1915 by Myles Silver RN)  Discharge to home or other facility with appropriate resources: Identify barriers to discharge with patient and caregiver     Problem: Pain  Goal: Verbalizes/displays adequate comfort level or baseline comfort level  Outcome: Progressing     Problem: Respiratory - Adult  Goal: Achieves optimal ventilation and oxygenation  6/21/2024 0805 by Latisha Sy RN  Outcome: Progressing  6/20/2024 2006 by Vicky Felton RCP  Outcome: Progressing  Flowsheets  Taken 6/20/2024 2005 by Vicky Felton RCP  Achieves optimal ventilation and oxygenation: Assess for changes in respiratory status  Taken 6/20/2024 1915 by Myles Silver RN  Achieves optimal ventilation and oxygenation: Assess for changes in respiratory status     Problem: Safety - Adult  Goal: Free from fall injury  Outcome: Progressing     Problem: Skin/Tissue Integrity  Goal: Absence of new skin breakdown  Description: 1.  Monitor for areas of redness and/or skin breakdown  2.  Assess vascular access sites hourly  3.  Every 4-6 hours minimum:  Change oxygen saturation probe site  4.  Every 4-6 hours:  If on nasal continuous positive airway pressure, respiratory therapy assess nares and determine need for appliance change or resting period.  Outcome: Progressing     1:55PM- Discharge order carried out. PIV line removed and telemetry monitoring discontinued, patient tolerated procedure well. Discharge instruction provided and explained to the patient, understanding verbalized, copy signed and attached to the chart. Patient  left with all her belongings via wheelchair, ushered to the main lobby by writer and VERNA Mendez. Patient is stable on final assessment. Patient left the hospital with her portable O2 tank accompanied by her caregiver, Asia, via their personal car. Goals met, discharged.

## 2024-07-03 NOTE — PROGRESS NOTES
Chronic Respiratory   Failure confirmed  -- Other - I will add my own diagnosis  -- Disagree - Not applicable / Not valid  -- Disagree - Clinically unable to determine / Unknown  -- Refer to Clinical Documentation Reviewer    PROVIDER RESPONSE TEXT:    Acute Respiratory Failure ruled out after study and Chronic Respiratory   Failure confirmed.    Query created by: Stephani Mckee on 6/27/2024 11:01 AM      Electronically signed by:  Berto Lynn MD 7/2/2024 8:34 PM